# Patient Record
Sex: MALE | Race: WHITE | NOT HISPANIC OR LATINO | Employment: OTHER | URBAN - METROPOLITAN AREA
[De-identification: names, ages, dates, MRNs, and addresses within clinical notes are randomized per-mention and may not be internally consistent; named-entity substitution may affect disease eponyms.]

---

## 2017-05-11 ENCOUNTER — OFFICE VISIT (OUTPATIENT)
Dept: GASTROENTEROLOGY | Facility: CLINIC | Age: 67
End: 2017-05-11

## 2017-05-11 VITALS
TEMPERATURE: 97.8 F | BODY MASS INDEX: 32.95 KG/M2 | HEIGHT: 64 IN | WEIGHT: 193 LBS | SYSTOLIC BLOOD PRESSURE: 122 MMHG | HEART RATE: 74 BPM | DIASTOLIC BLOOD PRESSURE: 78 MMHG | OXYGEN SATURATION: 97 %

## 2017-05-11 DIAGNOSIS — K56.60 INTESTINAL OBSTRUCTION, UNSPECIFIED TYPE: Primary | ICD-10-CM

## 2017-05-11 DIAGNOSIS — K55.9: ICD-10-CM

## 2017-05-11 PROBLEM — K56.609 INTESTINAL OBSTRUCTION: Status: ACTIVE | Noted: 2017-05-11

## 2017-05-11 PROCEDURE — 99213 OFFICE O/P EST LOW 20 MIN: CPT | Performed by: INTERNAL MEDICINE

## 2017-05-11 RX ORDER — ATORVASTATIN CALCIUM 40 MG/1
TABLET, FILM COATED ORAL
Refills: 0 | COMMUNITY
Start: 2017-04-19 | End: 2021-09-29 | Stop reason: SDUPTHER

## 2017-05-11 RX ORDER — LEVOTHYROXINE SODIUM 75 MCG
TABLET ORAL
Refills: 0 | COMMUNITY
Start: 2017-04-19 | End: 2021-09-29 | Stop reason: SDUPTHER

## 2021-09-29 ENCOUNTER — OFFICE VISIT (OUTPATIENT)
Dept: RADIATION ONCOLOGY | Facility: HOSPITAL | Age: 71
End: 2021-09-29

## 2021-09-29 ENCOUNTER — HOSPITAL ENCOUNTER (OUTPATIENT)
Dept: RADIATION ONCOLOGY | Facility: HOSPITAL | Age: 71
Setting detail: RADIATION/ONCOLOGY SERIES
Discharge: HOME | End: 2021-09-29

## 2021-09-29 VITALS
TEMPERATURE: 97.7 F | HEART RATE: 77 BPM | RESPIRATION RATE: 20 BRPM | DIASTOLIC BLOOD PRESSURE: 80 MMHG | BODY MASS INDEX: 31.28 KG/M2 | OXYGEN SATURATION: 96 % | SYSTOLIC BLOOD PRESSURE: 130 MMHG | WEIGHT: 180.8 LBS

## 2021-09-29 DIAGNOSIS — C61 PROSTATE CANCER (HCC): Primary | ICD-10-CM

## 2021-09-29 RX ORDER — ATORVASTATIN CALCIUM 20 MG/1
TABLET, FILM COATED ORAL
COMMUNITY
End: 2022-04-13 | Stop reason: SDUPTHER

## 2021-09-29 RX ORDER — LEVOTHYROXINE SODIUM 88 MCG
88 TABLET ORAL EVERY MORNING
COMMUNITY
Start: 2021-09-11

## 2021-09-29 RX ORDER — HYDROXYZINE PAMOATE 25 MG/1
CAPSULE ORAL
COMMUNITY
End: 2022-04-13 | Stop reason: SDUPTHER

## 2021-09-29 RX ORDER — LEVOTHYROXINE SODIUM 0.05 MG/1
TABLET ORAL
COMMUNITY
End: 2021-09-29 | Stop reason: SDUPTHER

## 2021-09-29 NOTE — PROGRESS NOTES
CONSULTATION NOTE      :                                                          1950  DATE OF CONSULTATION:                       2021   REQUESTING PHYSICIAN:                   Boom Durham MD  REASON FOR CONSULTATION:           Prostate cancer (HCC)  - Stage I (cT1c, cN0, cM0, PSA: 9.3, Grade Group: 1)       BRIEF HISTORY:  The patient is a very pleasant 71 y.o. male  with prostate cancer on active surveillance.  He was diagnosed with very low risk disease on 2020.  PSA was 7.58 ng/ml.  Biopsy showed presence of prostatic adenocarcinoma, Morro score 3+3 = 6, involving 2 out of 6 cores from the left lobe, at left base.  Tumor occupied 30% of submitted tissue.    Right lobe was benign but with focal atypical small acinar proliferation at the right mid gland.  Prostate gland measured 39 cc with no suspicious sonographic or palpable abnormality.  He experienced post biopsy prostatitis with E. coli infection.  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  Unfortunately, his PSA has steadily increased with values 7.9 ng/ml 2020 and maximum value 9.34 ng/mL on 8/3/2021.  He is now pursuing pursuing definitive treatment which is reasonable with [facility] healthy life expectancy calculator predicting an additional 22.9-year longevity.    Allergy: No Known Allergies    Social History:   Social History     Socioeconomic History   • Marital status:      Spouse name: Not on file   • Number of children: Not on file   • Years of education: Not on file   • Highest education level: Not on file   Tobacco Use   • Smoking status: Never Smoker   • Smokeless tobacco: Never Used   Substance and Sexual Activity   • Alcohol use: Yes   • Drug use: No   • Sexual activity: Defer       Past Medical History:   Past Medical History:   Diagnosis Date   • Disease of thyroid gland    • Diverticulosis    • Prostate cancer  (CMS/HCC)    • Skin cancer     basal cell and squamous cell       Family History: family history includes Lung cancer in his brother and brother.     Surgical History:   Past Surgical History:   Procedure Laterality Date   • APPENDECTOMY     • COLON RESECTION RIGHT     • COLON SURGERY      reanastomosis from colon resection   • COLONOSCOPY  04/09/2014   • PROSTATE BIOPSY          Review of Systems:   Review of Systems - Oncology              IPSS Questionnaire (AUA-7):  Over the past month…    1)  Incomplete Emptying  How often have you had a sensation of not emptying your bladder?  0 - Not at all   2)  Frequency  How often have you had to urinate less than every two hours? 0 - Not at all   3)  Intermittency  How often have you found you stopped and started again several times when you urinated?  0 - Not at all   4) Urgency  How often have you found it difficult to postpone urination?  0 - Not at all   5) Weak Stream  How often have you had a weak urinary stream?  0 - Not at all   6) Straining  How often have you had to push or strain to begin urination?  0 - Not at all   7) Nocturia  How many times did you typically get up at night to urinate?  2 - 2 times   Total Score:  2       Quality of life due to urinary symptoms:  If you were to spend the rest of your life with your urinary condition the way it is now, how would you feel about that? 0-Delighted   Urine Leakage (Incontinence) 0-No Leakage     Sexual Health Inventory  Current Status    1)  How do you rate your confidence that you could achieve and keep an erection? 4-High   2) When you had erections with sexual stimulation, how often were your erections hard enough for penetration (entering your partner)? 5-Almost always or always   3)  During sexual intercourse, how often were you able to maintain your erection after you had penetrated (entered) into your partner? 5-Almost always or always   4) During sexual intercourse, how difficult was it to maintain your  erection to completion of intercourse? 5 -almost always or always   5) When you attempted sexual intercourse, how often was it satisfactory to you? 5-Almost always or always   Total Score: 24       Bowel Health Inventory  Current Status: 0-No problems, no rectal bleeding, no discharge, less then 5 bowel movements a day           Objective   VITAL SIGNS:   Vitals:    09/29/21 1009   BP: 130/80   Pulse: 77   Resp: 20   Temp: 97.7 °F (36.5 °C)   TempSrc: Temporal   SpO2: 96%   Weight: 82 kg (180 lb 12.8 oz)   PainSc: 0-No pain        Karnofsky score: 90       Physical Exam:   Physical Exam  Vitals and nursing note reviewed.   Constitutional:       Appearance: He is well-developed.   HENT:      Head: Normocephalic and atraumatic.   Cardiovascular:      Rate and Rhythm: Normal rate and regular rhythm.      Heart sounds: Normal heart sounds. No murmur heard.     Pulmonary:      Effort: Pulmonary effort is normal.      Breath sounds: Normal breath sounds. No wheezing or rales.   Abdominal:      General: Bowel sounds are normal. There is no distension.      Palpations: Abdomen is soft.      Tenderness: There is no abdominal tenderness.   Genitourinary:     Prostate: Not tender and no nodules present. Enlarged:  30 to 40 cc, symmetric, soft, no nodules or induration.  Seminal vesicles are nonpalpable.      Rectum: No mass, tenderness, external hemorrhoid or internal hemorrhoid. Normal anal tone.   Musculoskeletal:         General: No tenderness. Normal range of motion.      Cervical back: Normal range of motion and neck supple.   Lymphadenopathy:      Cervical: No cervical adenopathy.      Upper Body:      Right upper body: No supraclavicular adenopathy.      Left upper body: No supraclavicular adenopathy.   Skin:     General: Skin is warm and dry.   Neurological:      Mental Status: He is alert and oriented to person, place, and time.      Sensory: No sensory deficit.   Psychiatric:         Behavior: Behavior normal.          Thought Content: Thought content normal.         Judgment: Judgment normal.              The following portions of the patient's history were reviewed and updated as appropriate: allergies, current medications, past family history, past medical history, past social history, past surgical history and problem list.    Assessment:   Assessment      Prostate cancer, [place] 3+3 = 6, clinical stage I (T1c, N0, M0), diagnosed 6/9/2020 when his PSA was 7.58 ng/ml.  He had very low risk disease and initially chose active surveillance.  PSA has increased to recent maximum value 9.34 ng/mL.  Definitive treatment would be appropriate at this time.  We reviewed the radiation treatment options of IMRT, SBRT or brachytherapy.  Patient is most interested in stereotactic body radiotherapy.  The CyberKnife treatment procedures been reviewed in detail today.  All questions were answered.  Informed consent was obtained.    RECOMMENDATIONS: He will return to Dr. Durham for placement of gold seed fiducials.  He will subsequently return here for treatment planning CT and MRI pelvis.  The prostate gland will receive 5 daily fractions of 7 Gray each, on the CyberKnife.    Follow Up:   Return in about 2 weeks (around 10/13/2021) for Office Visit, Simulation.  Diagnoses and all orders for this visit:    1. Prostate cancer (HCC) (Primary)         Clement Nicole MD

## 2021-09-30 ENCOUNTER — TELEPHONE (OUTPATIENT)
Dept: RADIATION ONCOLOGY | Facility: HOSPITAL | Age: 71
End: 2021-09-30

## 2021-09-30 DIAGNOSIS — C61 PROSTATE CANCER (HCC): Primary | ICD-10-CM

## 2021-09-30 NOTE — TELEPHONE ENCOUNTER
Called pt to ask where he would prefer his colonoscopy done.  He states he wants it done in [place] with Dr. Ubaldo Bojorquez.  Explained I would get him scheduled.  Pt verbalized understanding.

## 2021-10-06 DIAGNOSIS — C61 PROSTATE CANCER (HCC): Primary | ICD-10-CM

## 2021-10-20 DIAGNOSIS — C61 PROSTATE CANCER (HCC): Primary | ICD-10-CM

## 2021-10-28 ENCOUNTER — DOCUMENTATION (OUTPATIENT)
Dept: NUTRITION | Facility: HOSPITAL | Age: 71
End: 2021-10-28

## 2021-10-28 NOTE — PROGRESS NOTES
[facility] Nutrition    Phone consult with patient regarding the Gas Elimination Diet and instructions in preparation for the imaging scans and CK treatment for prostate cancer.  Reviewed the rationale for the diet modification, gas forming foods, schedule for following, Gas X, enemas, NPO status x 6 hours prior to MRI and appointment times.  Patient verbalized excellent comprehension of diet; all questions were addressed.

## 2021-11-05 ENCOUNTER — HOSPITAL ENCOUNTER (OUTPATIENT)
Dept: RADIATION ONCOLOGY | Facility: HOSPITAL | Age: 71
Setting detail: RADIATION/ONCOLOGY SERIES
Discharge: HOME | End: 2021-11-05

## 2021-11-05 ENCOUNTER — OFFICE VISIT (OUTPATIENT)
Dept: RADIATION ONCOLOGY | Facility: HOSPITAL | Age: 71
End: 2021-11-05

## 2021-11-05 ENCOUNTER — HOSPITAL ENCOUNTER (OUTPATIENT)
Dept: RADIATION ONCOLOGY | Facility: HOSPITAL | Age: 71
Discharge: HOME | End: 2021-11-05

## 2021-11-05 ENCOUNTER — HOSPITAL ENCOUNTER (OUTPATIENT)
Dept: MRI IMAGING | Facility: HOSPITAL | Age: 71
Discharge: HOME | End: 2021-11-05
Admitting: RADIOLOGY

## 2021-11-05 VITALS
HEART RATE: 77 BPM | BODY MASS INDEX: 31.33 KG/M2 | SYSTOLIC BLOOD PRESSURE: 140 MMHG | WEIGHT: 181.1 LBS | DIASTOLIC BLOOD PRESSURE: 71 MMHG | OXYGEN SATURATION: 97 % | TEMPERATURE: 96.8 F | RESPIRATION RATE: 20 BRPM

## 2021-11-05 DIAGNOSIS — C61 PROSTATE CANCER (HCC): Primary | ICD-10-CM

## 2021-11-05 DIAGNOSIS — C61 PROSTATE CANCER (HCC): ICD-10-CM

## 2021-11-05 LAB

## 2021-11-05 PROCEDURE — 87186 SC STD MICRODIL/AGAR DIL: CPT | Performed by: RADIOLOGY

## 2021-11-05 PROCEDURE — G0463 HOSPITAL OUTPT CLINIC VISIT: HCPCS

## 2021-11-05 PROCEDURE — 77399 UNLISTED PX MED RADJ PHYSICS: CPT | Performed by: RADIOLOGY

## 2021-11-05 PROCEDURE — 87077 CULTURE AEROBIC IDENTIFY: CPT | Performed by: RADIOLOGY

## 2021-11-05 PROCEDURE — 81001 URINALYSIS AUTO W/SCOPE: CPT | Performed by: RADIOLOGY

## 2021-11-05 PROCEDURE — 72195 MRI PELVIS W/O DYE: CPT

## 2021-11-05 PROCEDURE — 87086 URINE CULTURE/COLONY COUNT: CPT | Performed by: RADIOLOGY

## 2021-11-05 RX ORDER — LEVOFLOXACIN 500 MG/1
TABLET, FILM COATED ORAL EVERY 24 HOURS
COMMUNITY
End: 2022-02-16

## 2021-11-05 RX ORDER — TAMSULOSIN HYDROCHLORIDE 0.4 MG/1
1 CAPSULE ORAL NIGHTLY
Qty: 30 CAPSULE | Refills: 11 | Status: SHIPPED | OUTPATIENT
Start: 2021-11-05

## 2021-11-05 NOTE — PROGRESS NOTES
RE-EVALUATION    PATIENT:                                                      Talon Anguiano  :                                                          1950  DATE:                          2021   DIAGNOSIS:     Prostate cancer (HCC)  - Stage I (cT1c, cN0, cM0, PSA: 9.3, Grade Group: 1)       BRIEF HISTORY:  The patient is a very pleasant 71 y.o. male  with low risk prostate cancer.  He chose undergo definitive treatment with stereotactic body radiotherapy.  He experienced moderate prostatitis symptoms following placement of gold seed fiducials which lasted at least 3 days.  He did not take the full course of Levaquin due to reported symptoms.  Azo-Standard did not help dysuria or frequency symptoms.  He took 1 sulfa pill.  Maximum reported temperature was 100.7 degrees.  Urinary voiding has subsequently improved but symptoms have not completely resolved.  He is here today for simulation.  He has partially complied with the diet but has not been taking simethicone regularly.  He did perform an enema.      No Known Allergies    Review of Systems   All other systems reviewed and are negative.            IPSS Questionnaire (AUA-7):  Over the past month…     1)  Incomplete Emptying  How often have you had a sensation of not emptying your bladder?  0 - Not at all   2)  Frequency  How often have you had to urinate less than every two hours? 0 - Not at all   3)  Intermittency  How often have you found you stopped and started again several times when you urinated?  0 - Not at all   4) Urgency  How often have you found it difficult to postpone urination?  0 - Not at all   5) Weak Stream  How often have you had a weak urinary stream?  0 - Not at all   6) Straining  How often have you had to push or strain to begin urination?  0 - Not at all   7) Nocturia  How many times did you typically get up at night to urinate?  2 - 2 times   Total Score:  2         Quality of life due to urinary symptoms:  If you were to  spend the rest of your life with your urinary condition the way it is now, how would you feel about that? 0-Delighted   Urine Leakage (Incontinence) 0-No Leakage      Sexual Health Inventory  Current Status     1)  How do you rate your confidence that you could achieve and keep an erection? 4-High   2) When you had erections with sexual stimulation, how often were your erections hard enough for penetration (entering your partner)? 5-Almost always or always   3)  During sexual intercourse, how often were you able to maintain your erection after you had penetrated (entered) into your partner? 5-Almost always or always   4) During sexual intercourse, how difficult was it to maintain your erection to completion of intercourse? 5 -almost always or always   5) When you attempted sexual intercourse, how often was it satisfactory to you? 5-Almost always or always   Total Score: 24         Bowel Health Inventory  Current Status: 0-No problems, no rectal bleeding, no discharge, less then 5 bowel movements a day                    Objective   VITAL SIGNS:   Vitals:    11/05/21 0943   BP: 140/71   Pulse: 77   Resp: 20   Temp: 96.8 °F (36 °C)   TempSrc: Temporal   SpO2: 97%   Weight: 82.1 kg (181 lb 1.6 oz)   PainSc: 0-No pain            KSP %:  90    Physical Exam         The following portions of the patient's history were reviewed and updated as appropriate: allergies, current medications, past family history, past medical history, past social history, past surgical history and problem list.    Diagnoses and all orders for this visit:    Prostate cancer (HCC)  -     Urinalysis With Culture If Indicated -; Future  -     Urinalysis With Culture If Indicated -    Other orders  -     tamsulosin (FLOMAX) 0.4 MG capsule 24 hr capsule; Take 1 capsule by mouth Every Night.    IMPRESSION:  Prostate cancer, [place] 3+3 = 6, clinical stage I (T1c, N0, M0), diagnosed 6/9/2020 when his PSA was 7.58 ng/ml.  Following placement of gold seed  fiducials he experienced prostatitis type symptoms which improved but have not completely resolved.  Of note, he experienced prior prostatitis and urosepsis after biopsy.  Since symptoms have considerably improved over the past week, I believe we can proceed with treatment planning CT and MRI, as planned.  We will obtain a urinalysis today.    RECOMMENDATIONS:  As long as urinalysis, check CT and MRI do not show infection or significant prostatitis, we will proceed with CyberKnife treatment planning to deliver 35 Gray to the prostate gland and 5 fractions over approximately 1 week.       Clement Nicole MD

## 2021-11-06 RX ORDER — SULFAMETHOXAZOLE AND TRIMETHOPRIM 800; 160 MG/1; MG/1
1 TABLET ORAL 2 TIMES DAILY
Qty: 20 TABLET | Refills: 0 | Status: SHIPPED | OUTPATIENT
Start: 2021-11-06 | End: 2022-04-13

## 2021-11-07 ENCOUNTER — DOCUMENTATION (OUTPATIENT)
Dept: RADIATION ONCOLOGY | Facility: HOSPITAL | Age: 71
End: 2021-11-07

## 2021-11-07 LAB — BACTERIA SPEC AEROBE CULT: ABNORMAL

## 2021-11-07 RX ORDER — NITROFURANTOIN 25; 75 MG/1; MG/1
100 CAPSULE ORAL 2 TIMES DAILY
Qty: 20 CAPSULE | Refills: 0 | Status: SHIPPED | OUTPATIENT
Start: 2021-11-07 | End: 2021-12-01 | Stop reason: SDUPTHER

## 2021-11-07 NOTE — PROGRESS NOTES
Urine culture noted yesterday to be positive for greater than 100,000 CFU per mL E. coli.  I sent prescription for 10-day course of Bactrim yesterday.  Telephone conversation with patient today, he reports significant improvement with clearing of urine and reduction of urinary irritative symptoms.  Antibiotic sensitivity test available today; however, reports resistance to trimethoprim sulfamethoxazole as well as resistance to Levaquin and penicillins.  There is sensitivity to nitrofurantoin and to cephalosporins.  Patient reports he took 1 dose of Keflex on his own which may have partially helped his symptoms.  I will send in a new prescription for 10-day course of Macrobid (nitrofurantoin), which patient will begin today.

## 2021-11-08 ENCOUNTER — TELEPHONE (OUTPATIENT)
Dept: RADIATION ONCOLOGY | Facility: HOSPITAL | Age: 71
End: 2021-11-08

## 2021-11-08 NOTE — TELEPHONE ENCOUNTER
Called to inform pt that sensitivity of the urine culture and Dr. Moraes recommends that in addition to the macrobid that he get a one time injection of Ceftriaxone 1gram IM with his PCP or at an urgent care.  Pt verbalized understanding.

## 2021-11-12 ENCOUNTER — DOCUMENTATION (OUTPATIENT)
Dept: RADIATION ONCOLOGY | Facility: HOSPITAL | Age: 71
End: 2021-11-12

## 2021-11-12 NOTE — PROGRESS NOTES
Treatment planning contouring of the prostate gland based on CT simulation and MRI reveal a greater than 50 cc prostate volume with inflammatory changes.  The prostate was previously estimated at 30 to 40 cc volume.  Patient was found on urine culture from the day of the simulation to have a significant urinary tract infection after gold seed fiducial placement.  Antibiotic therapy has been instituted with Macrobid and Rocephin x 3 doses and he is clinically improving.  He does describe back pain which bothers him at night while resting in bed, and he will seek evaluation by Dr Martin to evaluate for muscle strain vs pyelonephritis.    Plan:  Patient will require a repeat CT simulation and repeat MRI in order to accurately treat the prostate cancer after resolution of the prostatitis/UTI which was causing inflammation and enlargement of the prostate gland.

## 2021-11-15 ENCOUNTER — TELEPHONE (OUTPATIENT)
Dept: RADIATION ONCOLOGY | Facility: HOSPITAL | Age: 71
End: 2021-11-15

## 2021-11-15 DIAGNOSIS — C61 PROSTATE CANCER (HCC): Primary | ICD-10-CM

## 2021-11-15 NOTE — TELEPHONE ENCOUNTER
Notified pt that Dr. Nicole wants to repeat his CT and MRI because he wants the infection of prostate cleared.  Discussed in detail with pt regarding diet and prep for the procedure.  On 11/27/2021 pt to begin gas reducing diet with gas-x 4 times per day.  On 11/29/2021 @ 0800 clinic  @ 0900 CT simulation  @ 1130 MRI.  Letter also mailed to pt.  Pt verbalized understanding.

## 2021-11-29 ENCOUNTER — HOSPITAL ENCOUNTER (OUTPATIENT)
Dept: RADIATION ONCOLOGY | Facility: HOSPITAL | Age: 71
Discharge: HOME | End: 2021-11-29

## 2021-11-29 ENCOUNTER — HOSPITAL ENCOUNTER (OUTPATIENT)
Dept: MRI IMAGING | Facility: HOSPITAL | Age: 71
Discharge: HOME | End: 2021-11-29
Admitting: RADIOLOGY

## 2021-11-29 ENCOUNTER — OFFICE VISIT (OUTPATIENT)
Dept: RADIATION ONCOLOGY | Facility: HOSPITAL | Age: 71
End: 2021-11-29

## 2021-11-29 VITALS
OXYGEN SATURATION: 97 % | DIASTOLIC BLOOD PRESSURE: 76 MMHG | TEMPERATURE: 97.6 F | WEIGHT: 183 LBS | HEIGHT: 72 IN | SYSTOLIC BLOOD PRESSURE: 141 MMHG | BODY MASS INDEX: 24.79 KG/M2 | RESPIRATION RATE: 16 BRPM | HEART RATE: 71 BPM

## 2021-11-29 DIAGNOSIS — C61 PROSTATE CANCER (HCC): Primary | ICD-10-CM

## 2021-11-29 DIAGNOSIS — C61 PROSTATE CANCER (HCC): ICD-10-CM

## 2021-11-29 LAB
BACTERIA UR QL AUTO: ABNORMAL /HPF
BILIRUB UR QL STRIP: NEGATIVE
CLARITY UR: CLEAR
COLOR UR: YELLOW
GLUCOSE UR STRIP-MCNC: NEGATIVE MG/DL
HGB UR QL STRIP.AUTO: NEGATIVE
HYALINE CASTS UR QL AUTO: ABNORMAL /LPF
KETONES UR QL STRIP: NEGATIVE
LEUKOCYTE ESTERASE UR QL STRIP.AUTO: ABNORMAL
NITRITE UR QL STRIP: POSITIVE
PH UR STRIP.AUTO: 6 [PH] (ref 5–8)
PROT UR QL STRIP: NEGATIVE
RBC # UR STRIP: ABNORMAL /HPF
REF LAB TEST METHOD: ABNORMAL
SP GR UR STRIP: 1.02 (ref 1–1.03)
SQUAMOUS #/AREA URNS HPF: ABNORMAL /HPF
UROBILINOGEN UR QL STRIP: ABNORMAL
WBC # UR STRIP: ABNORMAL /HPF

## 2021-11-29 PROCEDURE — 87086 URINE CULTURE/COLONY COUNT: CPT | Performed by: NURSE PRACTITIONER

## 2021-11-29 PROCEDURE — 87088 URINE BACTERIA CULTURE: CPT | Performed by: NURSE PRACTITIONER

## 2021-11-29 PROCEDURE — 81001 URINALYSIS AUTO W/SCOPE: CPT | Performed by: NURSE PRACTITIONER

## 2021-11-29 PROCEDURE — 77399 UNLISTED PX MED RADJ PHYSICS: CPT | Performed by: NURSE PRACTITIONER

## 2021-11-29 PROCEDURE — 87186 SC STD MICRODIL/AGAR DIL: CPT | Performed by: NURSE PRACTITIONER

## 2021-11-29 PROCEDURE — 72195 MRI PELVIS W/O DYE: CPT

## 2021-11-29 NOTE — PROGRESS NOTES
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  He chose to undergo definitive treatment with CyberKnife stereotactic body radiotherapy.  Following placement of gold seed fiducials, he developed prostatitis type symptoms.  UA and urine culture obtained previously in our office on the day of simulation revealed significant UTI.  He was also found to have inflammation and enlargement of the prostate on CT simulation and MRI pelvis at that time.  He has most recently completed courses of Macrobid x 10 days and Rocephin IM x 3 doses.  Urinary voiding has subsequently improved and previously reported irritative/outflow obstructive symptoms are resolved.  He has started alpha-blocker with Flomax.  He denies hematuria, dysuria, flank pain, or constitutional symptoms.  He does report some ongoing foul smelling urine at times.  He has had no other change in health since informed consent was obtained on 2021.  He remains a good candidate for SBRT.  He is on all recommended dietary restrictions and prep.    He is here today for simulation.          No Known Allergies    Review of Systems   All other systems reviewed and are negative.         KPS 90%    IPSS Questionnaire (AUA-7):  Over the past month…     1)  Incomplete Emptying  How often have you had a sensation of not emptying your bladder?  0 - Not at all   2)  Frequency  How often have you had to urinate less than every two hours? 0 - Not at all   3)  Intermittency  How often have you found you stopped and started again several times when you urinated?  0 - Not at all   4) Urgency  How often have you found it  "difficult to postpone urination?  0 - Not at all   5) Weak Stream  How often have you had a weak urinary stream?  0 - Not at all   6) Straining  How often have you had to push or strain to begin urination?  0 - Not at all   7) Nocturia  How many times did you typically get up at night to urinate?  2 - 2 times   Total Score:  2         Quality of life due to urinary symptoms:  If you were to spend the rest of your life with your urinary condition the way it is now, how would you feel about that? 0-Delighted   Urine Leakage (Incontinence) 0-No Leakage      Sexual Health Inventory  Current Status     1)  How do you rate your confidence that you could achieve and keep an erection? 4-High   2) When you had erections with sexual stimulation, how often were your erections hard enough for penetration (entering your partner)? 5-Almost always or always   3)  During sexual intercourse, how often were you able to maintain your erection after you had penetrated (entered) into your partner? 5-Almost always or always   4) During sexual intercourse, how difficult was it to maintain your erection to completion of intercourse? 5 -almost always or always   5) When you attempted sexual intercourse, how often was it satisfactory to you? 5-Almost always or always   Total Score: 24         Bowel Health Inventory  Current Status: 0-No problems, no rectal bleeding, no discharge, less then 5 bowel movements a day        Objective   VITAL SIGNS:   Vitals:    11/29/21 0805   BP: 141/76   Pulse: 71   Resp: 16   Temp: 97.6 °F (36.4 °C)   SpO2: 97%  Comment: RA   Weight: 83 kg (183 lb)   Height: 182.9 cm (72\")   PainSc: 0-No pain                Physical Exam  Vitals and nursing note reviewed.   Constitutional:       General: He is not in acute distress.     Appearance: Normal appearance. He is well-developed.   HENT:      Head: Normocephalic and atraumatic.   Eyes:      Conjunctiva/sclera: Conjunctivae normal.      Pupils: Pupils are equal, " round, and reactive to light.   Cardiovascular:      Rate and Rhythm: Normal rate and regular rhythm.   Pulmonary:      Effort: Pulmonary effort is normal.   Abdominal:      General: There is no distension.      Palpations: Abdomen is soft.      Tenderness: There is no abdominal tenderness. There is no right CVA tenderness or left CVA tenderness.   Musculoskeletal:         General: Normal range of motion.      Cervical back: Normal range of motion and neck supple.   Skin:     General: Skin is warm and dry.   Neurological:      Mental Status: He is alert and oriented to person, place, and time.   Psychiatric:         Behavior: Behavior normal.         Thought Content: Thought content normal.         Judgment: Judgment normal.            The following portions of the patient's history were reviewed and updated as appropriate: allergies, current medications, past family history, past medical history, past social history, past surgical history and problem list.    Diagnoses and all orders for this visit:    Prostate cancer (HCC)  -     Urinalysis With Culture If Indicated - Urine, Clean Catch; Future      IMPRESSION:  Prostate cancer, [place] 3+3 = 6, clinical stage I (T1c, N0, M0), diagnosed 6/9/2020 when his PSA was 7.58 ng/ml.  Maximum PSA value 9.34 ng/ml on 8/3/2021.  We will obtain repeat UA today with culture as indicated.      RECOMMENDATIONS:  Treatment planning CT and MRI pelvis will be performed today.  As long as urinalysis, treatment planning CT and MRI do not show residual infection or significant prostatitis, we will proceed with CyberKnife treatment planning to deliver 35 Gray to the prostate gland in 5 fractions over approximately 1 week, delivered on the CyberKnife.  He has been following all recommended dietary restrictions and prep.       DELORES Salazar

## 2021-11-30 DIAGNOSIS — C61 PROSTATE CANCER (HCC): Primary | ICD-10-CM

## 2021-12-01 ENCOUNTER — DOCUMENTATION (OUTPATIENT)
Dept: RADIATION ONCOLOGY | Facility: HOSPITAL | Age: 71
End: 2021-12-01

## 2021-12-01 ENCOUNTER — HOSPITAL ENCOUNTER (OUTPATIENT)
Dept: RADIATION ONCOLOGY | Facility: HOSPITAL | Age: 71
Setting detail: RADIATION/ONCOLOGY SERIES
Discharge: HOME | End: 2021-12-01

## 2021-12-01 LAB — BACTERIA SPEC AEROBE CULT: ABNORMAL

## 2021-12-01 RX ORDER — NITROFURANTOIN 25; 75 MG/1; MG/1
100 CAPSULE ORAL 2 TIMES DAILY
Qty: 20 CAPSULE | Refills: 0 | Status: SHIPPED | OUTPATIENT
Start: 2021-12-01 | End: 2022-02-16

## 2021-12-01 NOTE — PROGRESS NOTES
Telephone conversation with patient.    He completed prescribed course of antibiotics for E. coli UTI/prostatitis following placement of gold seed fiducials.  He had 10-day course of Macrobid and Rocephin x3.  Urinary irritative voiding symptoms are significantly improved but have not completely resolved.  Repeat treatment planning CT and MRI show improvement with less edema in the prostate gland.  The prostate volume has reduced from approximately 52 cc down to approximately 38 cc which is closer to the size noted on prior exam and imaging studies.  Recent repeat urinalysis and culture show some improvement with less bacteria and resolution of RBCs; however, there is still greater than 100,000 CFU E. coli with similar susceptibility/resistance profile.    Impression: Urinary tract infection/prostatitis is improved but not resolved after appropriate antibiotics course.    Stereotactic body radiotherapy to treat his prostate cancer should not be initiated until infection completely clears.    Patient we referred to infectious disease service for management.    He may require repeat treatment planning imaging studies.    I refilled Macrobid to restart until he is able to see infectious disease specialist.

## 2021-12-13 ENCOUNTER — TELEPHONE (OUTPATIENT)
Dept: RADIATION ONCOLOGY | Facility: HOSPITAL | Age: 71
End: 2021-12-13

## 2021-12-13 NOTE — TELEPHONE ENCOUNTER
Pt called asking about referral to infectious disease.   I explained we have sent everything to their office and I have left a message with the referral coordinator.   I explained as soon as they call me I will contact him.  Pt verbalized understanding.

## 2021-12-13 NOTE — TELEPHONE ENCOUNTER
Pt notified of appointment with infectious dz on Wed Dec 15 @ 1200.  Pt verbalized understanding.

## 2021-12-17 ENCOUNTER — TELEPHONE (OUTPATIENT)
Dept: RADIATION ONCOLOGY | Facility: HOSPITAL | Age: 71
End: 2021-12-17

## 2021-12-17 NOTE — TELEPHONE ENCOUNTER
Called 's office to get notes faxed over.  Office staff states pt did not show up for appointment.   Called pt and he states he was too tired to go to appointment.  He states Dr. Durham told him he needed 10 days of IV antibiotics.  I explained he needs to see ID because they are the experts.  I gave the pt Dr. Purvis's office number and instructed him to reschedule the appointment and call me back once he has been seen.  Pt verbalized understanding.

## 2022-01-10 ENCOUNTER — TELEPHONE (OUTPATIENT)
Dept: RADIATION ONCOLOGY | Facility: HOSPITAL | Age: 72
End: 2022-01-10

## 2022-01-10 NOTE — TELEPHONE ENCOUNTER
Pt called with c/o another urinary tract infection.  I explained he had been given an appointment to see infectious disease doctor and he did not go nor did he reschedule his appointment.  Pt argued that it took too long to get in to be seen.  I explained I have no control over their schedule and if he'd been seen on December 15, 2021, his original appointment, all this could have been cleared up by now. I asked pt what he wants me to do for him since he did not go to the ID appointment.  He states he wants to talk to Dr. Nicole and wants to be treated with IV antibiotics at home.  I explained Dr. Nicole does not order IV antibiotics and that he needs to see an ID specialist.  I told pt I would have Dr. Nicole call him.  Pt verbalized understanding.

## 2022-01-11 ENCOUNTER — TELEPHONE (OUTPATIENT)
Dept: RADIATION ONCOLOGY | Facility: HOSPITAL | Age: 72
End: 2022-01-11

## 2022-01-11 NOTE — TELEPHONE ENCOUNTER
Called to inform pt his appointment with Dr. Purvis is today at 2:15pm.  He asked what time it is now.  I explained its 2:08pm.  He states I really need to see him today.  I gave pt the office number and instructed him to call.  Pt verbalized understanding.

## 2022-01-11 NOTE — TELEPHONE ENCOUNTER
Attempted to contact pt.  No answer.  Left message that Dr. Nicole has personally spoke with Dr. Purvis and his office will be contacting him to get him an appointment this week.

## 2022-01-14 DIAGNOSIS — C61 PROSTATE CANCER: Primary | ICD-10-CM

## 2022-01-20 ENCOUNTER — TRANSCRIBE ORDERS (OUTPATIENT)
Dept: LAB | Facility: HOSPITAL | Age: 72
End: 2022-01-20

## 2022-01-20 ENCOUNTER — LAB (OUTPATIENT)
Dept: LAB | Facility: HOSPITAL | Age: 72
End: 2022-01-20

## 2022-01-20 DIAGNOSIS — C61 MALIGNANT NEOPLASM OF PROSTATE: ICD-10-CM

## 2022-01-20 DIAGNOSIS — N41.9 PROSTATITIS, UNSPECIFIED PROSTATITIS TYPE: ICD-10-CM

## 2022-01-20 DIAGNOSIS — B96.29 NON-SHIGA TOXIN-PRODUCING E. COLI: ICD-10-CM

## 2022-01-20 DIAGNOSIS — N39.0 URINARY TRACT INFECTION WITHOUT HEMATURIA, SITE UNSPECIFIED: Primary | ICD-10-CM

## 2022-01-20 DIAGNOSIS — N39.0 URINARY TRACT INFECTION WITHOUT HEMATURIA, SITE UNSPECIFIED: ICD-10-CM

## 2022-01-20 LAB
ALBUMIN SERPL-MCNC: 4.5 G/DL (ref 3.5–5.2)
ALBUMIN/GLOB SERPL: 2 G/DL
ALP SERPL-CCNC: 82 U/L (ref 39–117)
ALT SERPL W P-5'-P-CCNC: 19 U/L (ref 1–41)
ANION GAP SERPL CALCULATED.3IONS-SCNC: 11 MMOL/L (ref 5–15)
AST SERPL-CCNC: 18 U/L (ref 1–40)
BASOPHILS # BLD AUTO: 0.06 10*3/MM3 (ref 0–0.2)
BASOPHILS NFR BLD AUTO: 0.7 % (ref 0–1.5)
BILIRUB SERPL-MCNC: 0.4 MG/DL (ref 0–1.2)
BUN SERPL-MCNC: 18 MG/DL (ref 8–23)
BUN/CREAT SERPL: 19.8 (ref 7–25)
CALCIUM SPEC-SCNC: 8.9 MG/DL (ref 8.6–10.5)
CHLORIDE SERPL-SCNC: 100 MMOL/L (ref 98–107)
CO2 SERPL-SCNC: 28 MMOL/L (ref 22–29)
CREAT SERPL-MCNC: 0.91 MG/DL (ref 0.76–1.27)
CRP SERPL-MCNC: <0.3 MG/DL (ref 0–0.5)
DEPRECATED RDW RBC AUTO: 44.6 FL (ref 37–54)
EOSINOPHIL # BLD AUTO: 0.19 10*3/MM3 (ref 0–0.4)
EOSINOPHIL NFR BLD AUTO: 2.4 % (ref 0.3–6.2)
ERYTHROCYTE [DISTWIDTH] IN BLOOD BY AUTOMATED COUNT: 13.9 % (ref 12.3–15.4)
ERYTHROCYTE [SEDIMENTATION RATE] IN BLOOD: 7 MM/HR (ref 0–20)
GFR SERPL CREATININE-BSD FRML MDRD: 82 ML/MIN/1.73
GLOBULIN UR ELPH-MCNC: 2.3 GM/DL
GLUCOSE SERPL-MCNC: 121 MG/DL (ref 65–99)
HCT VFR BLD AUTO: 43.8 % (ref 37.5–51)
HGB BLD-MCNC: 14.1 G/DL (ref 13–17.7)
IMM GRANULOCYTES # BLD AUTO: 0.04 10*3/MM3 (ref 0–0.05)
IMM GRANULOCYTES NFR BLD AUTO: 0.5 % (ref 0–0.5)
LYMPHOCYTES # BLD AUTO: 2.41 10*3/MM3 (ref 0.7–3.1)
LYMPHOCYTES NFR BLD AUTO: 30 % (ref 19.6–45.3)
MCH RBC QN AUTO: 28.4 PG (ref 26.6–33)
MCHC RBC AUTO-ENTMCNC: 32.2 G/DL (ref 31.5–35.7)
MCV RBC AUTO: 88.3 FL (ref 79–97)
MONOCYTES # BLD AUTO: 0.53 10*3/MM3 (ref 0.1–0.9)
MONOCYTES NFR BLD AUTO: 6.6 % (ref 5–12)
NEUTROPHILS NFR BLD AUTO: 4.8 10*3/MM3 (ref 1.7–7)
NEUTROPHILS NFR BLD AUTO: 59.8 % (ref 42.7–76)
NRBC BLD AUTO-RTO: 0 /100 WBC (ref 0–0.2)
PLATELET # BLD AUTO: 219 10*3/MM3 (ref 140–450)
PMV BLD AUTO: 10.6 FL (ref 6–12)
POTASSIUM SERPL-SCNC: 4.4 MMOL/L (ref 3.5–5.2)
PROT SERPL-MCNC: 6.8 G/DL (ref 6–8.5)
RBC # BLD AUTO: 4.96 10*6/MM3 (ref 4.14–5.8)
SODIUM SERPL-SCNC: 139 MMOL/L (ref 136–145)
WBC NRBC COR # BLD: 8.03 10*3/MM3 (ref 3.4–10.8)

## 2022-01-20 PROCEDURE — 86140 C-REACTIVE PROTEIN: CPT

## 2022-01-20 PROCEDURE — 36415 COLL VENOUS BLD VENIPUNCTURE: CPT

## 2022-01-20 PROCEDURE — 85025 COMPLETE CBC W/AUTO DIFF WBC: CPT

## 2022-01-20 PROCEDURE — 80053 COMPREHEN METABOLIC PANEL: CPT

## 2022-01-20 PROCEDURE — 85652 RBC SED RATE AUTOMATED: CPT

## 2022-01-27 ENCOUNTER — LAB (OUTPATIENT)
Dept: LAB | Facility: HOSPITAL | Age: 72
End: 2022-01-27

## 2022-01-27 ENCOUNTER — TRANSCRIBE ORDERS (OUTPATIENT)
Dept: LAB | Facility: HOSPITAL | Age: 72
End: 2022-01-27

## 2022-01-27 DIAGNOSIS — R31.9 URINARY TRACT INFECTION WITH HEMATURIA, SITE UNSPECIFIED: ICD-10-CM

## 2022-01-27 DIAGNOSIS — N39.0 URINARY TRACT INFECTION WITH HEMATURIA, SITE UNSPECIFIED: ICD-10-CM

## 2022-01-27 DIAGNOSIS — N39.0 URINARY TRACT INFECTION WITH HEMATURIA, SITE UNSPECIFIED: Primary | ICD-10-CM

## 2022-01-27 DIAGNOSIS — R31.9 URINARY TRACT INFECTION WITH HEMATURIA, SITE UNSPECIFIED: Primary | ICD-10-CM

## 2022-01-27 LAB
ALBUMIN SERPL-MCNC: 4.6 G/DL (ref 3.5–5.2)
ALBUMIN/GLOB SERPL: 2 G/DL
ALP SERPL-CCNC: 81 U/L (ref 39–117)
ALT SERPL W P-5'-P-CCNC: 22 U/L (ref 1–41)
ANION GAP SERPL CALCULATED.3IONS-SCNC: 10 MMOL/L (ref 5–15)
AST SERPL-CCNC: 18 U/L (ref 1–40)
BASOPHILS # BLD AUTO: 0.08 10*3/MM3 (ref 0–0.2)
BASOPHILS NFR BLD AUTO: 1 % (ref 0–1.5)
BILIRUB SERPL-MCNC: 0.4 MG/DL (ref 0–1.2)
BUN SERPL-MCNC: 17 MG/DL (ref 8–23)
BUN/CREAT SERPL: 17.2 (ref 7–25)
CALCIUM SPEC-SCNC: 9.1 MG/DL (ref 8.6–10.5)
CHLORIDE SERPL-SCNC: 100 MMOL/L (ref 98–107)
CO2 SERPL-SCNC: 27 MMOL/L (ref 22–29)
CREAT SERPL-MCNC: 0.99 MG/DL (ref 0.76–1.27)
CRP SERPL-MCNC: <0.3 MG/DL (ref 0–0.5)
DEPRECATED RDW RBC AUTO: 44.3 FL (ref 37–54)
EOSINOPHIL # BLD AUTO: 0.25 10*3/MM3 (ref 0–0.4)
EOSINOPHIL NFR BLD AUTO: 3.3 % (ref 0.3–6.2)
ERYTHROCYTE [DISTWIDTH] IN BLOOD BY AUTOMATED COUNT: 13.7 % (ref 12.3–15.4)
ERYTHROCYTE [SEDIMENTATION RATE] IN BLOOD: 8 MM/HR (ref 0–20)
GFR SERPL CREATININE-BSD FRML MDRD: 74 ML/MIN/1.73
GLOBULIN UR ELPH-MCNC: 2.3 GM/DL
GLUCOSE SERPL-MCNC: 119 MG/DL (ref 65–99)
HCT VFR BLD AUTO: 44.8 % (ref 37.5–51)
HGB BLD-MCNC: 14.6 G/DL (ref 13–17.7)
IMM GRANULOCYTES # BLD AUTO: 0.03 10*3/MM3 (ref 0–0.05)
IMM GRANULOCYTES NFR BLD AUTO: 0.4 % (ref 0–0.5)
LYMPHOCYTES # BLD AUTO: 2.8 10*3/MM3 (ref 0.7–3.1)
LYMPHOCYTES NFR BLD AUTO: 36.4 % (ref 19.6–45.3)
MCH RBC QN AUTO: 28.5 PG (ref 26.6–33)
MCHC RBC AUTO-ENTMCNC: 32.6 G/DL (ref 31.5–35.7)
MCV RBC AUTO: 87.5 FL (ref 79–97)
MONOCYTES # BLD AUTO: 0.49 10*3/MM3 (ref 0.1–0.9)
MONOCYTES NFR BLD AUTO: 6.4 % (ref 5–12)
NEUTROPHILS NFR BLD AUTO: 4.04 10*3/MM3 (ref 1.7–7)
NEUTROPHILS NFR BLD AUTO: 52.5 % (ref 42.7–76)
NRBC BLD AUTO-RTO: 0 /100 WBC (ref 0–0.2)
PLATELET # BLD AUTO: 211 10*3/MM3 (ref 140–450)
PMV BLD AUTO: 10.2 FL (ref 6–12)
POTASSIUM SERPL-SCNC: 4.5 MMOL/L (ref 3.5–5.2)
PROT SERPL-MCNC: 6.9 G/DL (ref 6–8.5)
RBC # BLD AUTO: 5.12 10*6/MM3 (ref 4.14–5.8)
SODIUM SERPL-SCNC: 137 MMOL/L (ref 136–145)
WBC NRBC COR # BLD: 7.69 10*3/MM3 (ref 3.4–10.8)

## 2022-01-27 PROCEDURE — 80053 COMPREHEN METABOLIC PANEL: CPT

## 2022-01-27 PROCEDURE — 85652 RBC SED RATE AUTOMATED: CPT

## 2022-01-27 PROCEDURE — 86140 C-REACTIVE PROTEIN: CPT

## 2022-01-27 PROCEDURE — 36415 COLL VENOUS BLD VENIPUNCTURE: CPT

## 2022-01-27 PROCEDURE — 85025 COMPLETE CBC W/AUTO DIFF WBC: CPT

## 2022-02-15 ENCOUNTER — HOSPITAL ENCOUNTER (OUTPATIENT)
Dept: RADIATION ONCOLOGY | Facility: HOSPITAL | Age: 72
Setting detail: RADIATION/ONCOLOGY SERIES
Discharge: HOME | End: 2022-02-15

## 2022-02-16 ENCOUNTER — OFFICE VISIT (OUTPATIENT)
Dept: RADIATION ONCOLOGY | Facility: HOSPITAL | Age: 72
End: 2022-02-16

## 2022-02-16 ENCOUNTER — HOSPITAL ENCOUNTER (OUTPATIENT)
Dept: RADIATION ONCOLOGY | Facility: HOSPITAL | Age: 72
Discharge: HOME | End: 2022-02-16

## 2022-02-16 ENCOUNTER — HOSPITAL ENCOUNTER (OUTPATIENT)
Dept: MRI IMAGING | Facility: HOSPITAL | Age: 72
Discharge: HOME | End: 2022-02-16
Admitting: RADIOLOGY

## 2022-02-16 VITALS
OXYGEN SATURATION: 97 % | HEART RATE: 70 BPM | RESPIRATION RATE: 16 BRPM | DIASTOLIC BLOOD PRESSURE: 67 MMHG | HEIGHT: 73 IN | SYSTOLIC BLOOD PRESSURE: 124 MMHG | TEMPERATURE: 97.7 F | WEIGHT: 184.5 LBS | BODY MASS INDEX: 24.45 KG/M2

## 2022-02-16 DIAGNOSIS — C61 PROSTATE CANCER: Primary | ICD-10-CM

## 2022-02-16 DIAGNOSIS — C61 PROSTATE CANCER: ICD-10-CM

## 2022-02-16 PROCEDURE — G0463 HOSPITAL OUTPT CLINIC VISIT: HCPCS

## 2022-02-16 PROCEDURE — 77290 THER RAD SIMULAJ FIELD CPLX: CPT | Performed by: RADIOLOGY

## 2022-02-16 PROCEDURE — 72195 MRI PELVIS W/O DYE: CPT

## 2022-02-16 PROCEDURE — 77399 UNLISTED PX MED RADJ PHYSICS: CPT | Performed by: RADIOLOGY

## 2022-02-16 RX ORDER — CEFUROXIME AXETIL 500 MG/1
500 TABLET ORAL 2 TIMES DAILY
COMMUNITY
Start: 2022-01-28 | End: 2022-04-13 | Stop reason: SDUPTHER

## 2022-02-16 NOTE — PROGRESS NOTES
RE-EVALUATION    PATIENT:                                                      Talon Anguiano  :                                                          1950  DATE:                          2022   DIAGNOSIS:     Prostate cancer (HCC)  - Stage I (cT1c, cN0, cM0, PSA: 9.3, Grade Group: 1)         BRIEF HISTORY:  The patient is a very pleasant 71 y.o. male  with diagnosis of low risk prostate cancer.  He chose to undergo definitive treatment with stereotactic body radiotherapy.  Treatment has unfortunately been delayed a couple months due to prostatitis which has been difficult to clear.  He eventually was seen by infectious disease, Dr. Alcides Hodgson, and received 2-week course of IV Ceftin.  He is now on 30-day maintenance with oral antibiotics.  Urinary tract symptoms have resolved and he is clinically doing very well.  He returns today for a third attempt at CT simulation in preparation for SBRT delivered on the CyberKnife.    No Known Allergies    Review of Systems   All other systems reviewed and are negative.               IPSS Questionnaire (AUA-7):  Over the past month…     1)  Incomplete Emptying  How often have you had a sensation of not emptying your bladder?  0 - Not at all   2)  Frequency  How often have you had to urinate less than every two hours? 0 - Not at all   3)  Intermittency  How often have you found you stopped and started again several times when you urinated?  0 - Not at all   4) Urgency  How often have you found it difficult to postpone urination?  0 - Not at all   5) Weak Stream  How often have you had a weak urinary stream?  0 - Not at all   6) Straining  How often have you had to push or strain to begin urination?  0 - Not at all   7) Nocturia  How many times did you typically get up at night to urinate?  2 - 2 times   Total Score:  2         Quality of life due to urinary symptoms:  If you were to spend the rest of your life with your urinary condition the way it is now,  "how would you feel about that? 0-Delighted   Urine Leakage (Incontinence) 0-No Leakage      Sexual Health Inventory  Current Status     1)  How do you rate your confidence that you could achieve and keep an erection? 4-High   2) When you had erections with sexual stimulation, how often were your erections hard enough for penetration (entering your partner)? 5-Almost always or always   3)  During sexual intercourse, how often were you able to maintain your erection after you had penetrated (entered) into your partner? 5-Almost always or always   4) During sexual intercourse, how difficult was it to maintain your erection to completion of intercourse? 5 -almost always or always   5) When you attempted sexual intercourse, how often was it satisfactory to you? 5-Almost always or always   Total Score: 24         Bowel Health Inventory  Current Status: 0-No problems, no rectal bleeding, no discharge, less then 5 bowel movements a day                 Objective   VITAL SIGNS:   Vitals:    02/16/22 0947   BP: 124/67   Pulse: 70   Resp: 16   Temp: 97.7 °F (36.5 °C)   SpO2: 97%  Comment: RA   Weight: 83.7 kg (184 lb 8 oz)   Height: 184.2 cm (72.5\")   PainSc: 0-No pain                Physical Exam  Vitals and nursing note reviewed.   Constitutional:       Appearance: He is well-developed.   HENT:      Head: Normocephalic and atraumatic.   Cardiovascular:      Rate and Rhythm: Normal rate and regular rhythm.      Heart sounds: No murmur heard.      Pulmonary:      Effort: Pulmonary effort is normal.      Breath sounds: No wheezing or rales.   Chest:   Breasts:      Right: No supraclavicular adenopathy.      Left: No supraclavicular adenopathy.       Abdominal:      General: There is no distension.      Palpations: Abdomen is soft.      Tenderness: There is no abdominal tenderness.   Musculoskeletal:         General: No tenderness. Normal range of motion.      Cervical back: Normal range of motion and neck supple. "   Lymphadenopathy:      Cervical: No cervical adenopathy.      Upper Body:      Right upper body: No supraclavicular adenopathy.      Left upper body: No supraclavicular adenopathy.   Skin:     General: Skin is warm and dry.   Neurological:      Mental Status: He is alert and oriented to person, place, and time.      Sensory: No sensory deficit.   Psychiatric:         Behavior: Behavior normal.         Thought Content: Thought content normal.         Judgment: Judgment normal.              The following portions of the patient's history were reviewed and updated as appropriate: allergies, current medications, past family history, past medical history, past social history, past surgical history and problem list.    Diagnoses and all orders for this visit:    Prostate cancer (HCC)    Other orders  -     cefuroxime (CEFTIN) 500 MG tablet; Take 500 mg by mouth 2 (Two) Times a Day.      IMPRESSION:  Prostate cancer, [place] 3+3 = 6, clinical stage I (T1c, N0, M0), diagnosed 6/9/2020 when his PSA was 7.58 ng/ml.  Following placement of gold seed fiducials he experienced prostatitis which is finally cleared after multiple courses of antibiotics, eventually managed by infectious disease with IV Ceftin followed by current 30-day maintenance of oral antibiotics.  He is clinically doing well and ready to begin treatment planning for stereotactic body radiotherapy treatment delivered on CyberKnife.    RECOMMENDATIONS: CT simulation and MRI pelvis will be performed today.  Prostate gland will receive 5 daily fractions of 7 Luque each.         Clement Nicole MD     Approximately 15 minutes was spent with patient and reviewing records.

## 2022-03-01 ENCOUNTER — HOSPITAL ENCOUNTER (OUTPATIENT)
Dept: RADIATION ONCOLOGY | Facility: HOSPITAL | Age: 72
Setting detail: RADIATION/ONCOLOGY SERIES
Discharge: HOME | End: 2022-03-01

## 2022-03-07 ENCOUNTER — TELEPHONE (OUTPATIENT)
Dept: RADIATION ONCOLOGY | Facility: HOSPITAL | Age: 72
End: 2022-03-07

## 2022-03-07 NOTE — TELEPHONE ENCOUNTER
Pt called asking for treatment dates.  I explained the cyberknife machine was getting software update this week and treatment would not take place this week.  I told him to expect a call in the next few days.  Pt verbalized understanding.

## 2022-03-09 ENCOUNTER — DOCUMENTATION (OUTPATIENT)
Dept: RADIATION ONCOLOGY | Facility: HOSPITAL | Age: 72
End: 2022-03-09

## 2022-03-09 PROCEDURE — 77300 RADIATION THERAPY DOSE PLAN: CPT | Performed by: RADIOLOGY

## 2022-03-09 PROCEDURE — 77338 DESIGN MLC DEVICE FOR IMRT: CPT | Performed by: RADIOLOGY

## 2022-03-09 PROCEDURE — 77301 RADIOTHERAPY DOSE PLAN IMRT: CPT | Performed by: RADIOLOGY

## 2022-03-09 NOTE — PROGRESS NOTES
Telephone conversation with patient.    He has treatment planned he is ready to initiate stereotactic body radiotherapy definitive treatment for his prostate cancer.  Patient was called to set up an appointment time.  He has changed his mind and is now preferring active surveillance.  He understands he has low risk prostate cancer; however, over the past 2 years, since diagnosis, his PSA has slowly increased to a value of 9.37.  It be recommended to initiate treatment if his PSA reaches a value of 10 ng/ml or greater.  His last PSA was drawn in August 2021.  He will have another PSA drawn this week and notify us of the result.  If the PSA is lower, he should be fine continuing surveillance.  I did caution him that active surveillance does consist of periodically repeating a prostate biopsy and that his troubles to date have come from E. coli prostatitis from biopsy and fiducial placement.  If his PSA is trending towards or above a value of 10 ng/ml, he will likely decide to go ahead and initiate CyberKnife SBRT, as planned.

## 2022-03-23 ENCOUNTER — DOCUMENTATION (OUTPATIENT)
Dept: RADIATION ONCOLOGY | Facility: HOSPITAL | Age: 72
End: 2022-03-23

## 2022-03-23 NOTE — PROGRESS NOTES
Telephone conversation with patient.  He has decided to proceed with CyberKnife stereotactic body radiotherapy as definitive treatment for his low risk prostate cancer.  PSA drawn yesterday was 9.4 ng/ml, slightly higher than his previous value.  He has no recurrent lower urinary tract symptoms.  He had a negative urinalysis last week at his primary care office.  We will try to schedule his CyberKnife treatment as soon as possible.

## 2022-04-01 ENCOUNTER — TELEPHONE (OUTPATIENT)
Dept: RADIATION ONCOLOGY | Facility: HOSPITAL | Age: 72
End: 2022-04-01

## 2022-04-01 NOTE — TELEPHONE ENCOUNTER
Pt called stating his urine is infected again.  He states he went to the ER and his UA was bad but they did not run a culture.  He states he is having burning and frequency.  He states he wishes to talk to Dr. Nicole and he is going to contact Dr. Purvis.  Message relayed to Dr. Nicole.

## 2022-04-04 ENCOUNTER — HOSPITAL ENCOUNTER (OUTPATIENT)
Dept: RADIATION ONCOLOGY | Facility: HOSPITAL | Age: 72
Setting detail: RADIATION/ONCOLOGY SERIES
Discharge: HOME | End: 2022-04-04

## 2022-04-08 ENCOUNTER — TELEPHONE (OUTPATIENT)
Dept: RADIATION ONCOLOGY | Facility: HOSPITAL | Age: 72
End: 2022-04-08

## 2022-04-08 NOTE — TELEPHONE ENCOUNTER
Pt called stating he needs to speak to Dr. Nicole in person because he needs to ask him tough questions.  He states he has recently taken antibiotics given to him by his PCP for recent UTI.   I explained I would have the unit secretary called pt back with an appointment.   Pt verbalized understanding.

## 2022-04-13 ENCOUNTER — OFFICE VISIT (OUTPATIENT)
Dept: RADIATION ONCOLOGY | Facility: HOSPITAL | Age: 72
End: 2022-04-13

## 2022-04-13 VITALS
OXYGEN SATURATION: 95 % | TEMPERATURE: 98 F | WEIGHT: 184.9 LBS | RESPIRATION RATE: 16 BRPM | HEIGHT: 72 IN | DIASTOLIC BLOOD PRESSURE: 84 MMHG | BODY MASS INDEX: 25.04 KG/M2 | HEART RATE: 87 BPM | SYSTOLIC BLOOD PRESSURE: 131 MMHG

## 2022-04-13 DIAGNOSIS — C61 PROSTATE CANCER: Primary | ICD-10-CM

## 2022-04-13 RX ORDER — HYDROXYZINE HYDROCHLORIDE 25 MG/1
50 TABLET, FILM COATED ORAL
COMMUNITY
Start: 2022-03-08

## 2022-04-13 RX ORDER — CEFUROXIME AXETIL 500 MG/1
500 TABLET ORAL
Status: ON HOLD | COMMUNITY
Start: 2022-01-28 | End: 2022-08-29

## 2022-04-13 RX ORDER — ATORVASTATIN CALCIUM 40 MG/1
TABLET, FILM COATED ORAL
COMMUNITY
Start: 2021-12-08

## 2022-04-13 RX ORDER — NITROFURANTOIN 25; 75 MG/1; MG/1
1 CAPSULE ORAL 2 TIMES DAILY
COMMUNITY
Start: 2022-01-09 | End: 2022-04-13

## 2022-04-13 NOTE — PROGRESS NOTES
RE-EVALUATION    PATIENT:                                                      Talon Anguiano  :                                                          1950  DATE:                          2022   DIAGNOSIS:     Prostate cancer (HCC)  - Stage I (cT1c, cN0, cM0, PSA: 9.3, Grade Group: 1)         BRIEF HISTORY:  The patient is a very pleasant 71 y.o. male  with low risk prostate cancer.  He decided to undergo definitive treatment with stereotactic body radiotherapy.  He has had significant delay due to persistent, recurrent E. coli prostatitis requiring multiple rounds of antibiotics.  He has most recently been considering changing to active surveillance.  PSA value was stabilized at 9.4 ng/ml.  He recently experienced another flare of prostatitis symptoms.  He returned to Dr. Hodgson and has been on Ceftin 500 mg twice daily.  Dysuria has again resolved and he is voiding fairly normally.  He recently saw Dr. Umana at the [facility] for evaluation.  He underwent a PI-RADS contrast-enhanced pelvic MRI.  This reported a PI-RADS 5 lesion in the anterior prostate gland with suspicion of extraprostatic extension.  At this time, it is uncertain if this represents unbiopsied significant neoplasm or prostatic infection/abscess.  Patient believes he will be undergoing transperineal prostate biopsy at the [facility] in the near future.    No Known Allergies    Review of Systems   All other systems reviewed and are negative.              IPSS Questionnaire (AUA-7):  Over the past month…     1)  Incomplete Emptying  How often have you had a sensation of not emptying your bladder?  0 - Not at all   2)  Frequency  How often have you had to urinate less than every two hours? 0 - Not at all   3)  Intermittency  How often have you found you stopped and started again several times when you urinated?  0 - Not at all   4) Urgency  How often have you found it difficult to postpone urination?  0 -  "Not at all   5) Weak Stream  How often have you had a weak urinary stream?  0 - Not at all   6) Straining  How often have you had to push or strain to begin urination?  0 - Not at all   7) Nocturia  How many times did you typically get up at night to urinate?  2 - 2 times   Total Score:  2         Quality of life due to urinary symptoms:  If you were to spend the rest of your life with your urinary condition the way it is now, how would you feel about that? 0-Delighted   Urine Leakage (Incontinence) 0-No Leakage      Sexual Health Inventory  Current Status     1)  How do you rate your confidence that you could achieve and keep an erection? 4-High   2) When you had erections with sexual stimulation, how often were your erections hard enough for penetration (entering your partner)? 5-Almost always or always   3)  During sexual intercourse, how often were you able to maintain your erection after you had penetrated (entered) into your partner? 5-Almost always or always   4) During sexual intercourse, how difficult was it to maintain your erection to completion of intercourse? 5 -almost always or always   5) When you attempted sexual intercourse, how often was it satisfactory to you? 5-Almost always or always   Total Score: 24         Bowel Health Inventory  Current Status: 0-No problems, no rectal bleeding, no discharge, less then 5 bowel movements a day                  Objective   VITAL SIGNS:   Vitals:    04/13/22 1547   BP: 131/84   Pulse: 87   Resp: 16   Temp: 98 °F (36.7 °C)   SpO2: 95%  Comment: RA   Weight: 83.9 kg (184 lb 14.4 oz)   Height: 182.9 cm (72\")   PainSc: 0-No pain        Karnofsky score: 90   KSP %:  90    Physical Exam         The following portions of the patient's history were reviewed and updated as appropriate: allergies, current medications, past family history, past medical history, past social history, past surgical history and problem list.    Diagnoses and all orders for this " visit:    Prostate cancer (HCC)    Other orders  -     atorvastatin (LIPITOR) 40 MG tablet  -     hydrOXYzine (ATARAX) 25 MG tablet; Take 50 mg by mouth.  -     Discontinue: nitrofurantoin, macrocrystal-monohydrate, (MACROBID) 100 MG capsule; Take 1 capsule by mouth 2 (Two) Times a Day.  -     cefuroxime (CEFTIN) 500 MG tablet; Take 500 mg by mouth.      IMPRESSION:  Prostate cancer, [place] 3+3 = 6, clinical stage I (T1c, N0, M0), diagnosed 6/9/2020 when his PSA was 7.58 ng/ml.  Most recent PSA is 9.4 ng/ml.  He has had multiple prostate infections with E. coli bacteria.  He has not yet initiated definitive treatment of the prostate cancer.  Recent pelvic MRI showed a large suspicious PI-RADS 5 lesion in the anterior prostate gland.  This will be evaluated with transperineal MRI directed biopsy to rule out more significant neoplasm versus infection.    RECOMMENDATIONS: Patient will notify me of biopsy results.  We will not proceed with stereotactic body radiotherapy to the prostate at this time and will not be using the previous treatment planning CT or noncontrast MR imaging studies.  If we do eventually treat with stereotactic body radiotherapy or any other radiation modality, he would require repeat imaging.       Clement Nicole MD

## 2022-04-20 ENCOUNTER — TRANSCRIBE ORDERS (OUTPATIENT)
Dept: LAB | Facility: HOSPITAL | Age: 72
End: 2022-04-20

## 2022-04-20 ENCOUNTER — LAB (OUTPATIENT)
Dept: LAB | Facility: HOSPITAL | Age: 72
End: 2022-04-20

## 2022-04-20 DIAGNOSIS — N39.0 URINARY TRACT INFECTION WITHOUT HEMATURIA, SITE UNSPECIFIED: ICD-10-CM

## 2022-04-20 DIAGNOSIS — N41.1 CHRONIC PROSTATITIS: ICD-10-CM

## 2022-04-20 DIAGNOSIS — N41.1 CHRONIC PROSTATITIS: Primary | ICD-10-CM

## 2022-04-20 DIAGNOSIS — B96.29 NON-SHIGA TOXIN-PRODUCING E. COLI: ICD-10-CM

## 2022-04-20 LAB
BACTERIA UR QL AUTO: NORMAL /HPF
BILIRUB UR QL STRIP: NEGATIVE
CLARITY UR: CLEAR
COLOR UR: YELLOW
GLUCOSE UR STRIP-MCNC: NEGATIVE MG/DL
HGB UR QL STRIP.AUTO: NEGATIVE
HYALINE CASTS UR QL AUTO: NORMAL /LPF
KETONES UR QL STRIP: NEGATIVE
LEUKOCYTE ESTERASE UR QL STRIP.AUTO: NEGATIVE
NITRITE UR QL STRIP: NEGATIVE
PH UR STRIP.AUTO: 5.5 [PH] (ref 5–8)
PROT UR QL STRIP: NEGATIVE
RBC # UR STRIP: NORMAL /HPF
REF LAB TEST METHOD: NORMAL
SP GR UR STRIP: 1.02 (ref 1–1.03)
SQUAMOUS #/AREA URNS HPF: NORMAL /HPF
UROBILINOGEN UR QL STRIP: NORMAL
WBC # UR STRIP: NORMAL /HPF

## 2022-04-20 PROCEDURE — 87086 URINE CULTURE/COLONY COUNT: CPT

## 2022-04-20 PROCEDURE — 81001 URINALYSIS AUTO W/SCOPE: CPT

## 2022-04-21 LAB — BACTERIA SPEC AEROBE CULT: NO GROWTH

## 2022-05-02 ENCOUNTER — HOSPITAL ENCOUNTER (OUTPATIENT)
Dept: RADIATION ONCOLOGY | Facility: HOSPITAL | Age: 72
Setting detail: RADIATION/ONCOLOGY SERIES
Discharge: HOME | End: 2022-05-02

## 2022-06-28 ENCOUNTER — TELEPHONE (OUTPATIENT)
Dept: RADIATION ONCOLOGY | Facility: HOSPITAL | Age: 72
End: 2022-06-28

## 2022-06-28 DIAGNOSIS — C61 PROSTATE CANCER: Primary | ICD-10-CM

## 2022-06-28 NOTE — TELEPHONE ENCOUNTER
Pt called stating he spoke with Dr. Nicole last evening and wishes to proceed with his cyberknife treatment as soon as possible.   I explained that Dr. Nicole had told me and I had him scheduled for his re-eval appointment on 8/2/2022.  He states he wants his appointment sooner rather than later.  I explained that Dr. Nicole is on vacation for the next week and a half.  Then he has to plan his treatment.  I explained cyberknife planning is a process and non of this can be rushed.  I explained I gave him the earliest appointment that was available to coordinate with clinic, CT sim and MRI.  Pt states he wishes to have it done sooner.  I explained the best I can offer is if we have a cancellation, I will call him.  Pt verbalized understanding.

## 2022-06-30 ENCOUNTER — TELEPHONE (OUTPATIENT)
Dept: RADIATION ONCOLOGY | Facility: HOSPITAL | Age: 72
End: 2022-06-30

## 2022-06-30 NOTE — TELEPHONE ENCOUNTER
Pt called and left message that he would like to speak to me.  Called pt back and he states he has a trial starting Aug 9 that he cannot miss.  He states it is expected to last 3 weeks.  I explained it should not interfere with his treatment at all.   I explained it takes typically 3 weeks from planning scans until treatment starts.  Pt verbalized understanding.

## 2022-07-12 ENCOUNTER — TELEPHONE (OUTPATIENT)
Dept: RADIATION ONCOLOGY | Facility: HOSPITAL | Age: 72
End: 2022-07-12

## 2022-07-12 NOTE — TELEPHONE ENCOUNTER
Pt called stating he now has plane tickets for a vacation on Aug 29 and will not be available for treatment until Sept 6.  Discussed with Dr. Nicole and called pt back. Explained Dr. Nicole wants to move MRI and CT sim to early Sept when he gets back.   Pt verbalized understanding. I explained I would work on it and call him back tomorrow.

## 2022-08-02 ENCOUNTER — APPOINTMENT (OUTPATIENT)
Dept: MRI IMAGING | Facility: HOSPITAL | Age: 72
End: 2022-08-02

## 2022-08-29 ENCOUNTER — HOSPITAL ENCOUNTER (OUTPATIENT)
Facility: HOSPITAL | Age: 72
Setting detail: OBSERVATION
LOS: 1 days | Discharge: HOME | End: 2022-09-01
Attending: EMERGENCY MEDICINE | Admitting: INTERNAL MEDICINE

## 2022-08-29 ENCOUNTER — APPOINTMENT (OUTPATIENT)
Dept: GENERAL RADIOLOGY | Facility: HOSPITAL | Age: 72
End: 2022-08-29

## 2022-08-29 ENCOUNTER — APPOINTMENT (OUTPATIENT)
Dept: CT IMAGING | Facility: HOSPITAL | Age: 72
End: 2022-08-29

## 2022-08-29 DIAGNOSIS — K57.92 ACUTE DIVERTICULITIS: Primary | ICD-10-CM

## 2022-08-29 DIAGNOSIS — J06.9 VIRAL URI WITH COUGH: ICD-10-CM

## 2022-08-29 PROBLEM — J40 BRONCHITIS: Status: ACTIVE | Noted: 2022-08-29

## 2022-08-29 LAB
ALBUMIN SERPL-MCNC: 4 G/DL (ref 3.5–5.2)
ALBUMIN/GLOB SERPL: 1.3 G/DL
ALP SERPL-CCNC: 100 U/L (ref 39–117)
ALT SERPL W P-5'-P-CCNC: 18 U/L (ref 1–41)
ANION GAP SERPL CALCULATED.3IONS-SCNC: 11 MMOL/L (ref 5–15)
AST SERPL-CCNC: 16 U/L (ref 1–40)
B PARAPERT DNA SPEC QL NAA+PROBE: NOT DETECTED
B PERT DNA SPEC QL NAA+PROBE: NOT DETECTED
BASOPHILS # BLD AUTO: 0.05 10*3/MM3 (ref 0–0.2)
BASOPHILS NFR BLD AUTO: 0.4 % (ref 0–1.5)
BILIRUB SERPL-MCNC: 0.4 MG/DL (ref 0–1.2)
BILIRUB UR QL STRIP: NEGATIVE
BUN SERPL-MCNC: 10 MG/DL (ref 8–23)
BUN/CREAT SERPL: 11.6 (ref 7–25)
C PNEUM DNA NPH QL NAA+NON-PROBE: NOT DETECTED
CALCIUM SPEC-SCNC: 8.6 MG/DL (ref 8.6–10.5)
CHLORIDE SERPL-SCNC: 103 MMOL/L (ref 98–107)
CLARITY UR: CLEAR
CO2 SERPL-SCNC: 25 MMOL/L (ref 22–29)
COLOR UR: YELLOW
CREAT SERPL-MCNC: 0.86 MG/DL (ref 0.76–1.27)
D-LACTATE SERPL-SCNC: 1.2 MMOL/L (ref 0.5–2)
DEPRECATED RDW RBC AUTO: 42.2 FL (ref 37–54)
EGFRCR SERPLBLD CKD-EPI 2021: 90.9 ML/MIN/1.73
EOSINOPHIL # BLD AUTO: 0.14 10*3/MM3 (ref 0–0.4)
EOSINOPHIL NFR BLD AUTO: 1.2 % (ref 0.3–6.2)
ERYTHROCYTE [DISTWIDTH] IN BLOOD BY AUTOMATED COUNT: 13.4 % (ref 12.3–15.4)
FLUAV SUBTYP SPEC NAA+PROBE: NOT DETECTED
FLUAV SUBTYP SPEC NAA+PROBE: NOT DETECTED
FLUBV RNA ISLT QL NAA+PROBE: NOT DETECTED
FLUBV RNA ISLT QL NAA+PROBE: NOT DETECTED
GLOBULIN UR ELPH-MCNC: 3 GM/DL
GLUCOSE SERPL-MCNC: 114 MG/DL (ref 65–99)
GLUCOSE UR STRIP-MCNC: NEGATIVE MG/DL
HADV DNA SPEC NAA+PROBE: NOT DETECTED
HCOV 229E RNA SPEC QL NAA+PROBE: NOT DETECTED
HCOV HKU1 RNA SPEC QL NAA+PROBE: NOT DETECTED
HCOV NL63 RNA SPEC QL NAA+PROBE: NOT DETECTED
HCOV OC43 RNA SPEC QL NAA+PROBE: NOT DETECTED
HCT VFR BLD AUTO: 40 % (ref 37.5–51)
HGB BLD-MCNC: 13.4 G/DL (ref 13–17.7)
HGB UR QL STRIP.AUTO: NEGATIVE
HMPV RNA NPH QL NAA+NON-PROBE: NOT DETECTED
HOLD SPECIMEN: NORMAL
HPIV1 RNA ISLT QL NAA+PROBE: NOT DETECTED
HPIV2 RNA SPEC QL NAA+PROBE: NOT DETECTED
HPIV3 RNA NPH QL NAA+PROBE: NOT DETECTED
HPIV4 P GENE NPH QL NAA+PROBE: NOT DETECTED
IMM GRANULOCYTES # BLD AUTO: 0.07 10*3/MM3 (ref 0–0.05)
IMM GRANULOCYTES NFR BLD AUTO: 0.6 % (ref 0–0.5)
KETONES UR QL STRIP: NEGATIVE
LEUKOCYTE ESTERASE UR QL STRIP.AUTO: NEGATIVE
LIPASE SERPL-CCNC: 35 U/L (ref 13–60)
LYMPHOCYTES # BLD AUTO: 1.78 10*3/MM3 (ref 0.7–3.1)
LYMPHOCYTES NFR BLD AUTO: 15.1 % (ref 19.6–45.3)
M PNEUMO IGG SER IA-ACNC: NOT DETECTED
MCH RBC QN AUTO: 28.9 PG (ref 26.6–33)
MCHC RBC AUTO-ENTMCNC: 33.5 G/DL (ref 31.5–35.7)
MCV RBC AUTO: 86.4 FL (ref 79–97)
MONOCYTES # BLD AUTO: 0.93 10*3/MM3 (ref 0.1–0.9)
MONOCYTES NFR BLD AUTO: 7.9 % (ref 5–12)
NEUTROPHILS NFR BLD AUTO: 74.8 % (ref 42.7–76)
NEUTROPHILS NFR BLD AUTO: 8.78 10*3/MM3 (ref 1.7–7)
NITRITE UR QL STRIP: NEGATIVE
NRBC BLD AUTO-RTO: 0 /100 WBC (ref 0–0.2)
PH UR STRIP.AUTO: 5.5 [PH] (ref 5–8)
PLATELET # BLD AUTO: 293 10*3/MM3 (ref 140–450)
PMV BLD AUTO: 9.5 FL (ref 6–12)
POTASSIUM SERPL-SCNC: 4.1 MMOL/L (ref 3.5–5.2)
PROCALCITONIN SERPL-MCNC: 0.04 NG/ML (ref 0–0.25)
PROT SERPL-MCNC: 7 G/DL (ref 6–8.5)
PROT UR QL STRIP: NEGATIVE
RBC # BLD AUTO: 4.63 10*6/MM3 (ref 4.14–5.8)
RHINOVIRUS RNA SPEC NAA+PROBE: NOT DETECTED
RSV RNA NPH QL NAA+NON-PROBE: NOT DETECTED
SARS-COV-2 RNA NPH QL NAA+NON-PROBE: NOT DETECTED
SARS-COV-2 RNA PNL SPEC NAA+PROBE: NOT DETECTED
SODIUM SERPL-SCNC: 139 MMOL/L (ref 136–145)
SP GR UR STRIP: 1.01 (ref 1–1.03)
UROBILINOGEN UR QL STRIP: NORMAL
WBC NRBC COR # BLD: 11.75 10*3/MM3 (ref 3.4–10.8)
WHOLE BLOOD HOLD COAG: NORMAL
WHOLE BLOOD HOLD SPECIMEN: NORMAL

## 2022-08-29 PROCEDURE — 99284 EMERGENCY DEPT VISIT MOD MDM: CPT

## 2022-08-29 PROCEDURE — C9803 HOPD COVID-19 SPEC COLLECT: HCPCS

## 2022-08-29 PROCEDURE — 96365 THER/PROPH/DIAG IV INF INIT: CPT

## 2022-08-29 PROCEDURE — 36415 COLL VENOUS BLD VENIPUNCTURE: CPT

## 2022-08-29 PROCEDURE — 87040 BLOOD CULTURE FOR BACTERIA: CPT | Performed by: EMERGENCY MEDICINE

## 2022-08-29 PROCEDURE — 0202U NFCT DS 22 TRGT SARS-COV-2: CPT | Performed by: NURSE PRACTITIONER

## 2022-08-29 PROCEDURE — 81003 URINALYSIS AUTO W/O SCOPE: CPT | Performed by: EMERGENCY MEDICINE

## 2022-08-29 PROCEDURE — 85025 COMPLETE CBC W/AUTO DIFF WBC: CPT | Performed by: EMERGENCY MEDICINE

## 2022-08-29 PROCEDURE — 87636 SARSCOV2 & INF A&B AMP PRB: CPT | Performed by: EMERGENCY MEDICINE

## 2022-08-29 PROCEDURE — 25010000002 PIPERACILLIN SOD-TAZOBACTAM PER 1 G: Performed by: EMERGENCY MEDICINE

## 2022-08-29 PROCEDURE — 71045 X-RAY EXAM CHEST 1 VIEW: CPT

## 2022-08-29 PROCEDURE — 74177 CT ABD & PELVIS W/CONTRAST: CPT

## 2022-08-29 PROCEDURE — 83690 ASSAY OF LIPASE: CPT | Performed by: EMERGENCY MEDICINE

## 2022-08-29 PROCEDURE — 83605 ASSAY OF LACTIC ACID: CPT | Performed by: EMERGENCY MEDICINE

## 2022-08-29 PROCEDURE — 84145 PROCALCITONIN (PCT): CPT | Performed by: EMERGENCY MEDICINE

## 2022-08-29 PROCEDURE — 25010000002 IOPAMIDOL 61 % SOLUTION: Performed by: EMERGENCY MEDICINE

## 2022-08-29 PROCEDURE — 99220 PR INITIAL OBSERVATION CARE/DAY 70 MINUTES: CPT | Performed by: INTERNAL MEDICINE

## 2022-08-29 PROCEDURE — 80053 COMPREHEN METABOLIC PANEL: CPT | Performed by: EMERGENCY MEDICINE

## 2022-08-29 RX ORDER — SODIUM CHLORIDE 0.9 % (FLUSH) 0.9 %
10 SYRINGE (ML) INJECTION EVERY 12 HOURS SCHEDULED
Status: DISCONTINUED | OUTPATIENT
Start: 2022-08-29 | End: 2022-09-01 | Stop reason: HOSPADM

## 2022-08-29 RX ORDER — ONDANSETRON 2 MG/ML
4 INJECTION INTRAMUSCULAR; INTRAVENOUS ONCE
Status: DISCONTINUED | OUTPATIENT
Start: 2022-08-29 | End: 2022-09-01 | Stop reason: HOSPADM

## 2022-08-29 RX ORDER — ONDANSETRON 2 MG/ML
4 INJECTION INTRAMUSCULAR; INTRAVENOUS EVERY 6 HOURS PRN
Status: DISCONTINUED | OUTPATIENT
Start: 2022-08-29 | End: 2022-09-01 | Stop reason: HOSPADM

## 2022-08-29 RX ORDER — SODIUM CHLORIDE 0.9 % (FLUSH) 0.9 %
10 SYRINGE (ML) INJECTION AS NEEDED
Status: DISCONTINUED | OUTPATIENT
Start: 2022-08-29 | End: 2022-09-01 | Stop reason: HOSPADM

## 2022-08-29 RX ORDER — UBIDECARENONE 100 MG
100 CAPSULE ORAL DAILY
COMMUNITY

## 2022-08-29 RX ORDER — TAMSULOSIN HYDROCHLORIDE 0.4 MG/1
0.4 CAPSULE ORAL NIGHTLY
Status: DISCONTINUED | OUTPATIENT
Start: 2022-08-29 | End: 2022-09-01 | Stop reason: HOSPADM

## 2022-08-29 RX ORDER — SODIUM CHLORIDE 9 MG/ML
10 INJECTION INTRAVENOUS AS NEEDED
Status: DISCONTINUED | OUTPATIENT
Start: 2022-08-29 | End: 2022-09-01 | Stop reason: HOSPADM

## 2022-08-29 RX ORDER — HYDROXYZINE HYDROCHLORIDE 25 MG/1
25 TABLET, FILM COATED ORAL NIGHTLY PRN
Status: DISCONTINUED | OUTPATIENT
Start: 2022-08-29 | End: 2022-09-01 | Stop reason: HOSPADM

## 2022-08-29 RX ORDER — LEVOTHYROXINE SODIUM 88 UG/1
88 TABLET ORAL
Status: DISCONTINUED | OUTPATIENT
Start: 2022-08-30 | End: 2022-09-01 | Stop reason: HOSPADM

## 2022-08-29 RX ORDER — SODIUM CHLORIDE, SODIUM LACTATE, POTASSIUM CHLORIDE, CALCIUM CHLORIDE 600; 310; 30; 20 MG/100ML; MG/100ML; MG/100ML; MG/100ML
75 INJECTION, SOLUTION INTRAVENOUS CONTINUOUS
Status: ACTIVE | OUTPATIENT
Start: 2022-08-29 | End: 2022-08-30

## 2022-08-29 RX ORDER — ATORVASTATIN CALCIUM 40 MG/1
40 TABLET, FILM COATED ORAL DAILY
Status: DISCONTINUED | OUTPATIENT
Start: 2022-08-30 | End: 2022-08-29

## 2022-08-29 RX ORDER — BENZONATATE 100 MG/1
100 CAPSULE ORAL ONCE
Status: COMPLETED | OUTPATIENT
Start: 2022-08-29 | End: 2022-08-29

## 2022-08-29 RX ORDER — ATORVASTATIN CALCIUM 40 MG/1
40 TABLET, FILM COATED ORAL NIGHTLY
Status: DISCONTINUED | OUTPATIENT
Start: 2022-08-29 | End: 2022-09-01 | Stop reason: HOSPADM

## 2022-08-29 RX ORDER — CHOLECALCIFEROL (VITAMIN D3) 125 MCG
5 CAPSULE ORAL NIGHTLY PRN
Status: DISCONTINUED | OUTPATIENT
Start: 2022-08-29 | End: 2022-09-01 | Stop reason: HOSPADM

## 2022-08-29 RX ORDER — ONDANSETRON 4 MG/1
4 TABLET, FILM COATED ORAL EVERY 6 HOURS PRN
Status: DISCONTINUED | OUTPATIENT
Start: 2022-08-29 | End: 2022-09-01 | Stop reason: HOSPADM

## 2022-08-29 RX ADMIN — BENZONATATE 100 MG: 100 CAPSULE ORAL at 20:48

## 2022-08-29 RX ADMIN — Medication 10 ML: at 22:53

## 2022-08-29 RX ADMIN — IOPAMIDOL 100 ML: 612 INJECTION, SOLUTION INTRAVENOUS at 18:55

## 2022-08-29 RX ADMIN — TAZOBACTAM SODIUM AND PIPERACILLIN SODIUM 3.38 G: 375; 3 INJECTION, SOLUTION INTRAVENOUS at 20:48

## 2022-08-29 RX ADMIN — SODIUM CHLORIDE, POTASSIUM CHLORIDE, SODIUM LACTATE AND CALCIUM CHLORIDE 75 ML/HR: 600; 310; 30; 20 INJECTION, SOLUTION INTRAVENOUS at 22:53

## 2022-08-29 RX ADMIN — GUAIFENESIN 200 MG: 200 SOLUTION ORAL at 22:53

## 2022-08-29 RX ADMIN — ATORVASTATIN CALCIUM 40 MG: 40 TABLET, FILM COATED ORAL at 22:53

## 2022-08-29 RX ADMIN — SODIUM CHLORIDE 500 ML: 9 INJECTION, SOLUTION INTRAVENOUS at 19:55

## 2022-08-29 RX ADMIN — HYDROXYZINE HYDROCHLORIDE 25 MG: 25 TABLET, FILM COATED ORAL at 22:53

## 2022-08-29 RX ADMIN — TAMSULOSIN HYDROCHLORIDE 0.4 MG: 0.4 CAPSULE ORAL at 22:53

## 2022-08-30 LAB
ANION GAP SERPL CALCULATED.3IONS-SCNC: 7 MMOL/L (ref 5–15)
BASOPHILS # BLD AUTO: 0.04 10*3/MM3 (ref 0–0.2)
BASOPHILS NFR BLD AUTO: 0.3 % (ref 0–1.5)
BUN SERPL-MCNC: 9 MG/DL (ref 8–23)
BUN/CREAT SERPL: 9.3 (ref 7–25)
CALCIUM SPEC-SCNC: 8.7 MG/DL (ref 8.6–10.5)
CHLORIDE SERPL-SCNC: 103 MMOL/L (ref 98–107)
CO2 SERPL-SCNC: 28 MMOL/L (ref 22–29)
CREAT SERPL-MCNC: 0.97 MG/DL (ref 0.76–1.27)
DEPRECATED RDW RBC AUTO: 42.3 FL (ref 37–54)
EGFRCR SERPLBLD CKD-EPI 2021: 81.9 ML/MIN/1.73
EOSINOPHIL # BLD AUTO: 0.2 10*3/MM3 (ref 0–0.4)
EOSINOPHIL NFR BLD AUTO: 1.5 % (ref 0.3–6.2)
ERYTHROCYTE [DISTWIDTH] IN BLOOD BY AUTOMATED COUNT: 13.3 % (ref 12.3–15.4)
GLUCOSE SERPL-MCNC: 97 MG/DL (ref 65–99)
HCT VFR BLD AUTO: 38.7 % (ref 37.5–51)
HGB BLD-MCNC: 12.8 G/DL (ref 13–17.7)
IMM GRANULOCYTES # BLD AUTO: 0.08 10*3/MM3 (ref 0–0.05)
IMM GRANULOCYTES NFR BLD AUTO: 0.6 % (ref 0–0.5)
INR PPP: 1.05 (ref 0.84–1.13)
LYMPHOCYTES # BLD AUTO: 2.01 10*3/MM3 (ref 0.7–3.1)
LYMPHOCYTES NFR BLD AUTO: 15.1 % (ref 19.6–45.3)
MCH RBC QN AUTO: 28.6 PG (ref 26.6–33)
MCHC RBC AUTO-ENTMCNC: 33.1 G/DL (ref 31.5–35.7)
MCV RBC AUTO: 86.6 FL (ref 79–97)
MONOCYTES # BLD AUTO: 0.85 10*3/MM3 (ref 0.1–0.9)
MONOCYTES NFR BLD AUTO: 6.4 % (ref 5–12)
NEUTROPHILS NFR BLD AUTO: 10.13 10*3/MM3 (ref 1.7–7)
NEUTROPHILS NFR BLD AUTO: 76.1 % (ref 42.7–76)
NRBC BLD AUTO-RTO: 0 /100 WBC (ref 0–0.2)
PLATELET # BLD AUTO: 267 10*3/MM3 (ref 140–450)
PMV BLD AUTO: 9.6 FL (ref 6–12)
POTASSIUM SERPL-SCNC: 4.6 MMOL/L (ref 3.5–5.2)
PROTHROMBIN TIME: 13.6 SECONDS (ref 11.4–14.4)
RBC # BLD AUTO: 4.47 10*6/MM3 (ref 4.14–5.8)
SODIUM SERPL-SCNC: 138 MMOL/L (ref 136–145)
WBC NRBC COR # BLD: 13.31 10*3/MM3 (ref 3.4–10.8)

## 2022-08-30 PROCEDURE — 99225 PR SBSQ OBSERVATION CARE/DAY 25 MINUTES: CPT | Performed by: INTERNAL MEDICINE

## 2022-08-30 PROCEDURE — 25010000002 PIPERACILLIN SOD-TAZOBACTAM PER 1 G: Performed by: NURSE PRACTITIONER

## 2022-08-30 PROCEDURE — G0378 HOSPITAL OBSERVATION PER HR: HCPCS

## 2022-08-30 PROCEDURE — 80048 BASIC METABOLIC PNL TOTAL CA: CPT | Performed by: NURSE PRACTITIONER

## 2022-08-30 PROCEDURE — 96366 THER/PROPH/DIAG IV INF ADDON: CPT

## 2022-08-30 PROCEDURE — 85025 COMPLETE CBC W/AUTO DIFF WBC: CPT | Performed by: NURSE PRACTITIONER

## 2022-08-30 PROCEDURE — 85610 PROTHROMBIN TIME: CPT | Performed by: NURSE PRACTITIONER

## 2022-08-30 RX ORDER — BENZONATATE 100 MG/1
200 CAPSULE ORAL 3 TIMES DAILY PRN
Status: DISCONTINUED | OUTPATIENT
Start: 2022-08-30 | End: 2022-09-01 | Stop reason: HOSPADM

## 2022-08-30 RX ORDER — GUAIFENESIN AND CODEINE PHOSPHATE 100; 10 MG/5ML; MG/5ML
5 SOLUTION ORAL EVERY 4 HOURS PRN
Status: DISCONTINUED | OUTPATIENT
Start: 2022-08-30 | End: 2022-09-01 | Stop reason: HOSPADM

## 2022-08-30 RX ADMIN — TAZOBACTAM SODIUM AND PIPERACILLIN SODIUM 3.38 G: 375; 3 INJECTION, SOLUTION INTRAVENOUS at 09:21

## 2022-08-30 RX ADMIN — GUAIFENESIN 200 MG: 200 SOLUTION ORAL at 06:06

## 2022-08-30 RX ADMIN — LEVOTHYROXINE SODIUM 88 MCG: 88 TABLET ORAL at 06:06

## 2022-08-30 RX ADMIN — HYDROXYZINE HYDROCHLORIDE 25 MG: 25 TABLET, FILM COATED ORAL at 22:17

## 2022-08-30 RX ADMIN — ATORVASTATIN CALCIUM 40 MG: 40 TABLET, FILM COATED ORAL at 20:42

## 2022-08-30 RX ADMIN — GUAIFENESIN AND CODEINE PHOSPHATE 5 ML: 10; 100 LIQUID ORAL at 12:14

## 2022-08-30 RX ADMIN — BENZONATATE 200 MG: 100 CAPSULE ORAL at 03:35

## 2022-08-30 RX ADMIN — GUAIFENESIN AND CODEINE PHOSPHATE 5 ML: 10; 100 LIQUID ORAL at 17:37

## 2022-08-30 RX ADMIN — TAMSULOSIN HYDROCHLORIDE 0.4 MG: 0.4 CAPSULE ORAL at 20:42

## 2022-08-30 RX ADMIN — TAZOBACTAM SODIUM AND PIPERACILLIN SODIUM 3.38 G: 375; 3 INJECTION, SOLUTION INTRAVENOUS at 02:17

## 2022-08-30 RX ADMIN — Medication 10 ML: at 20:42

## 2022-08-30 RX ADMIN — GUAIFENESIN AND CODEINE PHOSPHATE 5 ML: 10; 100 LIQUID ORAL at 21:38

## 2022-08-30 RX ADMIN — TAZOBACTAM SODIUM AND PIPERACILLIN SODIUM 3.38 G: 375; 3 INJECTION, SOLUTION INTRAVENOUS at 17:34

## 2022-08-31 LAB
ALBUMIN SERPL-MCNC: 3.5 G/DL (ref 3.5–5.2)
ALBUMIN/GLOB SERPL: 1.2 G/DL
ALP SERPL-CCNC: 94 U/L (ref 39–117)
ALT SERPL W P-5'-P-CCNC: 15 U/L (ref 1–41)
ANION GAP SERPL CALCULATED.3IONS-SCNC: 10 MMOL/L (ref 5–15)
AST SERPL-CCNC: 17 U/L (ref 1–40)
BILIRUB SERPL-MCNC: 0.5 MG/DL (ref 0–1.2)
BUN SERPL-MCNC: 9 MG/DL (ref 8–23)
BUN/CREAT SERPL: 9 (ref 7–25)
CALCIUM SPEC-SCNC: 8.4 MG/DL (ref 8.6–10.5)
CHLORIDE SERPL-SCNC: 102 MMOL/L (ref 98–107)
CO2 SERPL-SCNC: 27 MMOL/L (ref 22–29)
CREAT SERPL-MCNC: 1 MG/DL (ref 0.76–1.27)
DEPRECATED RDW RBC AUTO: 43.3 FL (ref 37–54)
EGFRCR SERPLBLD CKD-EPI 2021: 79 ML/MIN/1.73
ERYTHROCYTE [DISTWIDTH] IN BLOOD BY AUTOMATED COUNT: 13.2 % (ref 12.3–15.4)
GLOBULIN UR ELPH-MCNC: 2.9 GM/DL
GLUCOSE SERPL-MCNC: 117 MG/DL (ref 65–99)
HCT VFR BLD AUTO: 38.5 % (ref 37.5–51)
HGB BLD-MCNC: 12.5 G/DL (ref 13–17.7)
MCH RBC QN AUTO: 28.7 PG (ref 26.6–33)
MCHC RBC AUTO-ENTMCNC: 32.5 G/DL (ref 31.5–35.7)
MCV RBC AUTO: 88.3 FL (ref 79–97)
PLATELET # BLD AUTO: 274 10*3/MM3 (ref 140–450)
PMV BLD AUTO: 9.8 FL (ref 6–12)
POTASSIUM SERPL-SCNC: 3.7 MMOL/L (ref 3.5–5.2)
PROT SERPL-MCNC: 6.4 G/DL (ref 6–8.5)
RBC # BLD AUTO: 4.36 10*6/MM3 (ref 4.14–5.8)
SODIUM SERPL-SCNC: 139 MMOL/L (ref 136–145)
WBC NRBC COR # BLD: 8.75 10*3/MM3 (ref 3.4–10.8)

## 2022-08-31 PROCEDURE — 85027 COMPLETE CBC AUTOMATED: CPT | Performed by: INTERNAL MEDICINE

## 2022-08-31 PROCEDURE — 25010000002 PIPERACILLIN SOD-TAZOBACTAM PER 1 G: Performed by: INTERNAL MEDICINE

## 2022-08-31 PROCEDURE — 25010000002 PIPERACILLIN SOD-TAZOBACTAM PER 1 G: Performed by: NURSE PRACTITIONER

## 2022-08-31 PROCEDURE — G0378 HOSPITAL OBSERVATION PER HR: HCPCS

## 2022-08-31 PROCEDURE — 80053 COMPREHEN METABOLIC PANEL: CPT | Performed by: INTERNAL MEDICINE

## 2022-08-31 PROCEDURE — 99225 PR SBSQ OBSERVATION CARE/DAY 25 MINUTES: CPT | Performed by: INTERNAL MEDICINE

## 2022-08-31 PROCEDURE — 96366 THER/PROPH/DIAG IV INF ADDON: CPT

## 2022-08-31 RX ORDER — AMOXICILLIN AND CLAVULANATE POTASSIUM 562.5; 437.5; 62.5 MG/1; MG/1; MG/1
2 TABLET, FILM COATED, EXTENDED RELEASE ORAL EVERY 12 HOURS SCHEDULED
Status: DISCONTINUED | OUTPATIENT
Start: 2022-08-31 | End: 2022-08-31

## 2022-08-31 RX ORDER — DIPHENOXYLATE HYDROCHLORIDE AND ATROPINE SULFATE 2.5; .025 MG/1; MG/1
1 TABLET ORAL DAILY
Status: DISCONTINUED | OUTPATIENT
Start: 2022-08-31 | End: 2022-09-01 | Stop reason: HOSPADM

## 2022-08-31 RX ADMIN — TAMSULOSIN HYDROCHLORIDE 0.4 MG: 0.4 CAPSULE ORAL at 20:22

## 2022-08-31 RX ADMIN — LEVOTHYROXINE SODIUM 88 MCG: 88 TABLET ORAL at 05:32

## 2022-08-31 RX ADMIN — Medication 10 ML: at 20:22

## 2022-08-31 RX ADMIN — GUAIFENESIN AND CODEINE PHOSPHATE 5 ML: 10; 100 LIQUID ORAL at 15:48

## 2022-08-31 RX ADMIN — TAZOBACTAM SODIUM AND PIPERACILLIN SODIUM 3.38 G: 375; 3 INJECTION, SOLUTION INTRAVENOUS at 01:21

## 2022-08-31 RX ADMIN — TAZOBACTAM SODIUM AND PIPERACILLIN SODIUM 3.38 G: 375; 3 INJECTION, SOLUTION INTRAVENOUS at 11:52

## 2022-08-31 RX ADMIN — ATORVASTATIN CALCIUM 40 MG: 40 TABLET, FILM COATED ORAL at 20:22

## 2022-08-31 RX ADMIN — MULTIVITAMIN TABLET 1 TABLET: TABLET at 15:48

## 2022-08-31 RX ADMIN — GUAIFENESIN AND CODEINE PHOSPHATE 5 ML: 10; 100 LIQUID ORAL at 09:52

## 2022-08-31 RX ADMIN — TAZOBACTAM SODIUM AND PIPERACILLIN SODIUM 3.38 G: 375; 3 INJECTION, SOLUTION INTRAVENOUS at 20:22

## 2022-09-01 ENCOUNTER — READMISSION MANAGEMENT (OUTPATIENT)
Dept: CALL CENTER | Facility: HOSPITAL | Age: 72
End: 2022-09-01

## 2022-09-01 VITALS
SYSTOLIC BLOOD PRESSURE: 120 MMHG | WEIGHT: 186 LBS | HEIGHT: 72 IN | DIASTOLIC BLOOD PRESSURE: 96 MMHG | HEART RATE: 82 BPM | RESPIRATION RATE: 16 BRPM | OXYGEN SATURATION: 96 % | TEMPERATURE: 97.9 F | BODY MASS INDEX: 25.19 KG/M2

## 2022-09-01 LAB
ANION GAP SERPL CALCULATED.3IONS-SCNC: 8 MMOL/L (ref 5–15)
BASOPHILS # BLD AUTO: 0.05 10*3/MM3 (ref 0–0.2)
BASOPHILS NFR BLD AUTO: 0.6 % (ref 0–1.5)
BUN SERPL-MCNC: 10 MG/DL (ref 8–23)
BUN/CREAT SERPL: 9.5 (ref 7–25)
CALCIUM SPEC-SCNC: 8.4 MG/DL (ref 8.6–10.5)
CHLORIDE SERPL-SCNC: 102 MMOL/L (ref 98–107)
CO2 SERPL-SCNC: 27 MMOL/L (ref 22–29)
CREAT SERPL-MCNC: 1.05 MG/DL (ref 0.76–1.27)
DEPRECATED RDW RBC AUTO: 42 FL (ref 37–54)
EGFRCR SERPLBLD CKD-EPI 2021: 74.5 ML/MIN/1.73
EOSINOPHIL # BLD AUTO: 0.28 10*3/MM3 (ref 0–0.4)
EOSINOPHIL NFR BLD AUTO: 3.4 % (ref 0.3–6.2)
ERYTHROCYTE [DISTWIDTH] IN BLOOD BY AUTOMATED COUNT: 13.3 % (ref 12.3–15.4)
GLUCOSE SERPL-MCNC: 98 MG/DL (ref 65–99)
HCT VFR BLD AUTO: 37.2 % (ref 37.5–51)
HGB BLD-MCNC: 12.4 G/DL (ref 13–17.7)
IMM GRANULOCYTES # BLD AUTO: 0.05 10*3/MM3 (ref 0–0.05)
IMM GRANULOCYTES NFR BLD AUTO: 0.6 % (ref 0–0.5)
LYMPHOCYTES # BLD AUTO: 2.26 10*3/MM3 (ref 0.7–3.1)
LYMPHOCYTES NFR BLD AUTO: 27.1 % (ref 19.6–45.3)
MCH RBC QN AUTO: 29 PG (ref 26.6–33)
MCHC RBC AUTO-ENTMCNC: 33.3 G/DL (ref 31.5–35.7)
MCV RBC AUTO: 87.1 FL (ref 79–97)
MONOCYTES # BLD AUTO: 0.66 10*3/MM3 (ref 0.1–0.9)
MONOCYTES NFR BLD AUTO: 7.9 % (ref 5–12)
NEUTROPHILS NFR BLD AUTO: 5.05 10*3/MM3 (ref 1.7–7)
NEUTROPHILS NFR BLD AUTO: 60.4 % (ref 42.7–76)
NRBC BLD AUTO-RTO: 0 /100 WBC (ref 0–0.2)
PLATELET # BLD AUTO: 266 10*3/MM3 (ref 140–450)
PMV BLD AUTO: 9.8 FL (ref 6–12)
POTASSIUM SERPL-SCNC: 4.1 MMOL/L (ref 3.5–5.2)
RBC # BLD AUTO: 4.27 10*6/MM3 (ref 4.14–5.8)
SODIUM SERPL-SCNC: 137 MMOL/L (ref 136–145)
WBC NRBC COR # BLD: 8.35 10*3/MM3 (ref 3.4–10.8)

## 2022-09-01 PROCEDURE — 25010000002 ERTAPENEM PER 500 MG: Performed by: INTERNAL MEDICINE

## 2022-09-01 PROCEDURE — G0378 HOSPITAL OBSERVATION PER HR: HCPCS

## 2022-09-01 PROCEDURE — 85025 COMPLETE CBC W/AUTO DIFF WBC: CPT | Performed by: INTERNAL MEDICINE

## 2022-09-01 PROCEDURE — 96366 THER/PROPH/DIAG IV INF ADDON: CPT

## 2022-09-01 PROCEDURE — 80048 BASIC METABOLIC PNL TOTAL CA: CPT | Performed by: INTERNAL MEDICINE

## 2022-09-01 PROCEDURE — 25010000002 PIPERACILLIN SOD-TAZOBACTAM PER 1 G: Performed by: INTERNAL MEDICINE

## 2022-09-01 PROCEDURE — 96367 TX/PROPH/DG ADDL SEQ IV INF: CPT

## 2022-09-01 PROCEDURE — 99217 PR OBSERVATION CARE DISCHARGE MANAGEMENT: CPT | Performed by: INTERNAL MEDICINE

## 2022-09-01 RX ORDER — GUAIFENESIN AND CODEINE PHOSPHATE 100; 10 MG/5ML; MG/5ML
5 SOLUTION ORAL EVERY 4 HOURS PRN
Qty: 118 ML | Refills: 0 | Status: SHIPPED | OUTPATIENT
Start: 2022-09-01 | End: 2022-11-03

## 2022-09-01 RX ADMIN — TAZOBACTAM SODIUM AND PIPERACILLIN SODIUM 3.38 G: 375; 3 INJECTION, SOLUTION INTRAVENOUS at 03:08

## 2022-09-01 RX ADMIN — LEVOTHYROXINE SODIUM 88 MCG: 88 TABLET ORAL at 05:18

## 2022-09-01 RX ADMIN — ERTAPENEM 1 G: 1 INJECTION INTRAMUSCULAR; INTRAVENOUS at 09:39

## 2022-09-01 RX ADMIN — MULTIVITAMIN TABLET 1 TABLET: TABLET at 07:59

## 2022-09-01 RX ADMIN — GUAIFENESIN AND CODEINE PHOSPHATE 5 ML: 10; 100 LIQUID ORAL at 00:03

## 2022-09-01 RX ADMIN — HYDROXYZINE HYDROCHLORIDE 25 MG: 25 TABLET, FILM COATED ORAL at 00:03

## 2022-09-01 NOTE — PROGRESS NOTES
"Patient Name:  Talon Anguiano  YOB: 1950  [id number]    Surgery Progress Note    Date of visit: 9/1/2022    Subjective   No abdominal pain. No fevers/chills. No nausea/vomiting. +BM/flatus         Objective       /96 (BP Location: Right arm, Patient Position: Sitting)   Pulse 82   Temp 97.9 °F (36.6 °C) (Oral)   Resp 16   Ht 182.9 cm (72\")   Wt 84.4 kg (186 lb)   SpO2 96%   BMI 25.23 kg/m²     Intake/Output Summary (Last 24 hours) at 9/1/2022 1008  Last data filed at 9/1/2022 0700  Gross per 24 hour   Intake 1600 ml   Output --   Net 1600 ml       CV:  Rhythm regular and rate regular  L:  Clear to auscultation bilaterally  Abd:  Bowel sounds positive, soft, nontender/nondistended  Ext:  No cyanosis, clubbing, edema    Recent labs and imaging that are back at this time have been reviewed.   WBC 8.4  Hgb 12.4  Cr 1.05         Assessment & Plan     Patient with acute diverticulitis  Pain control  IV abx per ID  DC planning OK from surgical perspective; should follow with me in 1-2 weeks to set up outpatient colonoscopy        Tony Arrington MD  9/1/2022  10:08 EDT      "

## 2022-09-01 NOTE — DISCHARGE SUMMARY
[facility] Medicine Services  DISCHARGE SUMMARY    Patient Name: Talon Anguiano  : 1950  MRN: [id number]    Date of Admission: 2022  7:42 PM  Date of Discharge:  2022  Primary Care Physician: Didier Ortiz MD    Consults     Date and Time Order Name Status Description    2022  3:09 PM Inpatient Infectious Diseases Consult Completed     2022 12:34 AM Inpatient General Surgery Consult Completed           Hospital Course     Presenting Problem:   Acute diverticulitis [K57.92]    Active Hospital Problems    Diagnosis  POA   • Diverticulitis [K57.92]  Yes   • Acute diverticulitis [K57.92]  Yes   • Bronchitis [J40]  Yes   • Prostate cancer (HCC) [C61]  Yes      Resolved Hospital Problems   No resolved problems to display.          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        Diverticulitis with abscess  History of diverticulosis  -- CT abdomen pelvis shows moderately extensive mid sigmoid diverticulitis with minimal extraluminal free air and questionable early development of small abscess.  No free intra-abdominal air nor well-defined drainable abscess seen.  -- has been on Zosyn, will transition to daily IV Ertapenem per Dr. Strickland. Follow up next week with ID.  -- needs repeat CT A/P in 1-2 weeks to ensure resolution/improvement of abscess  -- BC x2 NGTD  -- Pain control  -- Consulted surgery  -- advanced diet and pt tolerating regular diet well  -- Leukocytosis has now resolved     Bronchitis  --respiratory panel unremarkable  --albuterol inhaler  --supportive care  -- Tussionex Q4 hours per pt request     History of prostate cancer     Hypothyroidism  -- levothyroxine               Discharge Follow Up Recommendations for outpatient labs/diagnostics:    "Follow up with PCP within one week  Follow up with ID in 1-2 weeks  Follow up with Surgery/Dr. Arrington as per his instructions    Day of Discharge     HPI:   Doing well today, no new issues overnight. States that he is bored in the hospital \"which is a good sign\".     Review of Systems  Gen- No fevers, chills  CV- No chest pain, palpitations  Resp- No cough, dyspnea  GI- No N/V/D, abd pain        Vital Signs:   Temp:  [97.8 °F (36.6 °C)-98.2 °F (36.8 °C)] 97.9 °F (36.6 °C)  Heart Rate:  [] 82  Resp:  [16-18] 16  BP: (111-156)/(67-96) 120/96      Physical Exam:  Constitutional: No acute distress, awake, alert  HENT: NCAT, mucous membranes moist  Respiratory: Clear to auscultation bilaterally, respiratory effort normal   Cardiovascular: RRR, no murmurs, rubs, or gallops  Gastrointestinal: Positive bowel sounds, soft, nontender, nondistended  Musculoskeletal: No bilateral ankle edema  Psychiatric: Appropriate affect, cooperative  Neurologic: Oriented x 3, strength symmetric in all extremities, Cranial Nerves grossly intact to confrontation, speech clear  Skin: No     Pertinent  and/or Most Recent Results     LAB RESULTS:      Lab 09/01/22  0705 08/31/22  1343 08/30/22  0917 08/29/22  2013 08/29/22  1632   WBC 8.35 8.75 13.31*  --  11.75*   HEMOGLOBIN 12.4* 12.5* 12.8*  --  13.4   HEMATOCRIT 37.2* 38.5 38.7  --  40.0   PLATELETS 266 274 267  --  293   NEUTROS ABS 5.05  --  10.13*  --  8.78*   IMMATURE GRANS (ABS) 0.05  --  0.08*  --  0.07*   LYMPHS ABS 2.26  --  2.01  --  1.78   MONOS ABS 0.66  --  0.85  --  0.93*   EOS ABS 0.28  --  0.20  --  0.14   MCV 87.1 88.3 86.6  --  86.4   PROCALCITONIN  --   --   --  0.04  --    LACTATE  --   --   --   --  1.2   PROTIME  --   --  13.6  --   --          Lab 09/01/22  0705 08/31/22  1343 08/30/22  0917 08/29/22  1632   SODIUM 137 139 138 139   POTASSIUM 4.1 3.7 4.6 4.1   CHLORIDE 102 102 103 103   CO2 27.0 27.0 28.0 25.0   ANION GAP 8.0 10.0 7.0 11.0   BUN 10 9 9 10 "   CREATININE 1.05 1.00 0.97 0.86   EGFR 74.5 79.0 81.9 90.9   GLUCOSE 98 117* 97 114*   CALCIUM 8.4* 8.4* 8.7 8.6         Lab 08/31/22  1343 08/29/22  1632   TOTAL PROTEIN 6.4 7.0   ALBUMIN 3.50 4.00   GLOBULIN 2.9 3.0   ALT (SGPT) 15 18   AST (SGOT) 17 16   BILIRUBIN 0.5 0.4   ALK PHOS 94 100   LIPASE  --  35         Lab 08/30/22  0917   PROTIME 13.6   INR 1.05                 Brief Urine Lab Results  (Last result in the past 365 days)      Color   Clarity   Blood   Leuk Est   Nitrite   Protein   CREAT   Urine HCG        08/29/22 1639 Yellow   Clear   Negative   Negative   Negative   Negative               Microbiology Results (last 10 days)     Procedure Component Value - Date/Time    Blood Culture - Blood, Arm, Right [[id number]]  (Normal) Collected: 08/29/22 2010    Lab Status: Preliminary result Specimen: Blood from Arm, Right Updated: 08/31/22 2131     Blood Culture No growth at 2 days    Blood Culture - Blood, Arm, Right [[id number]]  (Normal) Collected: 08/29/22 2000    Lab Status: Preliminary result Specimen: Blood from Arm, Right Updated: 08/31/22 2131     Blood Culture No growth at 2 days    COVID-19 and FLU A/B PCR - Swab, Nasopharynx [[id number]]  (Normal) Collected: 08/29/22 1924    Lab Status: Final result Specimen: Swab from Nasopharynx Updated: 08/29/22 2107     COVID19 Not Detected     Influenza A PCR Not Detected     Influenza B PCR Not Detected    Narrative:      Fact sheet for providers: https://www.fda.gov/media/352901/download    Fact sheet for patients: https://www.fda.gov/media/020405/download    Test performed by PCR.    Respiratory Panel PCR w/COVID-19(SARS-CoV-2) [facility] In-House, NP Swab in UTM/VTM, 3-4 HR TAT - Swab, Nasopharynx [[id number]]  (Normal) Collected: 08/29/22 1924    Lab Status: Final result Specimen: Swab from Nasopharynx Updated: 08/29/22 2234     ADENOVIRUS, PCR Not Detected     Coronavirus 229E Not Detected     Coronavirus HKU1 Not Detected      Coronavirus NL63 Not Detected     Coronavirus OC43 Not Detected     COVID19 Not Detected     Human Metapneumovirus Not Detected     Human Rhinovirus/Enterovirus Not Detected     Influenza A PCR Not Detected     Influenza B PCR Not Detected     Parainfluenza Virus 1 Not Detected     Parainfluenza Virus 2 Not Detected     Parainfluenza Virus 3 Not Detected     Parainfluenza Virus 4 Not Detected     RSV, PCR Not Detected     Bordetella pertussis pcr Not Detected     Bordetella parapertussis PCR Not Detected     Chlamydophila pneumoniae PCR Not Detected     Mycoplasma pneumo by PCR Not Detected    Narrative:      In the setting of a positive respiratory panel with a viral infection PLUS a negative procalcitonin without other underlying concern for bacterial infection, consider observing off antibiotics or discontinuation of antibiotics and continue supportive care. If the respiratory panel is positive for atypical bacterial infection (Bordetella pertussis, Chlamydophila pneumoniae, or Mycoplasma pneumoniae), consider antibiotic de-escalation to target atypical bacterial infection.          CT Abdomen Pelvis With Contrast    Result Date: 8/29/2022  DATE OF EXAM: 8/29/2022 6:43 PM  PROCEDURE: CT ABDOMEN PELVIS W CONTRAST-  INDICATIONS: RLQ pain  COMPARISON: No comparisons available.  TECHNIQUE: Routine transaxial slices were obtained through the abdomen and pelvis after the intravenous administration of 100 mL of Isovue 300. Reconstructed coronal and sagittal images were also obtained. Automated exposure control and iterative construction methods were used.  The radiation dose reduction device was turned on for each scan per the ALARA (As Low as Reasonably Achievable) protocol.  FINDINGS: There is an approximately 8 cm segment of thickened/inflamed mid sigmoid, with what appear to be a couple of bubbles of extraluminal air. There may be a very small area of developing abscess 15 mm in diameter on axial image 111 series 3,  but no evidence of a well-defined, drainable abscess is seen. No free air is identified. There is a trace amount of intrapelvic free fluid.  Elsewhere, the included lower lungs appear clear. There is postcholecystectomy dilatation of the common bile duct and intrahepatic ducts. Liver otherwise appears unremarkable. Spleen is not enlarged. Pancreas adrenal glands, and kidneys appear within normal limits. No upper abdominal free air or ascites is seen. No adenopathy is appreciated. No dilated bowel loops are seen. A small bowel-transverse colon anastomosis appears clear of nodularity or inflammation. Bladder is moderately distended and normal in appearance. Several fiducials are incidentally noted in the prostate. Bony structures appear to be intact.        1. Moderately extensive mid sigmoid diverticulitis, with minimal extraluminal free air, and questionable early development of small abscess. No free intra-abdominal air or well-defined, drainable abscess are seen. 2. No other evidence of acute intra-abdominal or intrapelvic disease elsewhere.  This report was finalized on 8/29/2022 7:47 PM by Dr. Bismark Yee MD.      XR Chest 1 View    Result Date: 8/29/2022  DATE OF EXAM: 8/29/2022 6:10 PM  PROCEDURE: XR CHEST 1 VW-  INDICATIONS: cough  COMPARISON: No comparisons available.  TECHNIQUE: Single radiographic AP view of the chest was obtained.  FINDINGS: The heart, mediastinum and pulmonary vasculature appear within normal limits. Lungs are moderately well-expanded and appear clear except for appearance of mild peribronchial thickening of the lower lungs, which could be chronic or could represent a bronchitis/viral syndrome. No focal lung disease effusion or pneumothorax is seen.      Peribronchial thickening of the lower lungs, with history of cough, suspicious for viral syndrome/bronchitis.  This report was finalized on 8/29/2022 6:31 PM by Dr. Bismark Yee MD.                    Plan for Follow-up of Pending  Labs/Results:   Pending Labs     Order Current Status    Blood Culture - Blood, Arm, Right Preliminary result    Blood Culture - Blood, Arm, Right Preliminary result        Discharge Details        Discharge Medications      New Medications      Instructions Start Date   ertapenem 1 gm/100ml solution IV  Commonly known as: INVanz   1 g, Intravenous, Daily   Start Date: September 2, 2022     guaiFENesin-codeine 100-10 MG/5ML liquid  Commonly known as: GUAIFENESIN AC   5 mL, Oral, Every 4 Hours PRN         Continue These Medications      Instructions Start Date   atorvastatin 40 MG tablet  Commonly known as: LIPITOR   No dose, route, or frequency recorded.      coenzyme Q10 100 MG capsule   100 mg, Oral, Daily      hydrOXYzine 25 MG tablet  Commonly known as: ATARAX   50 mg, Oral      Synthroid 88 MCG tablet  Generic drug: levothyroxine   88 mcg, Oral, Every Morning      tamsulosin 0.4 MG capsule 24 hr capsule  Commonly known as: FLOMAX   0.4 mg, Oral, Nightly             Allergies   Allergen Reactions   • Levaquin [Levofloxacin] Other (See Comments)     Urethritis          Discharge Disposition:      Diet:  Hospital:  Diet Order   Procedures   • Diet Regular; GI Soft       Activity:      Restrictions or Other Recommendations:         CODE STATUS:    Code Status and Medical Interventions:   Ordered at: 08/29/22 2131     Level Of Support Discussed With:    Patient     Code Status (Patient has no pulse and is not breathing):    CPR (Attempt to Resuscitate)     Medical Interventions (Patient has pulse or is breathing):    Full Support       Future Appointments   Date Time Provider Department Center   9/7/2022 10:30 AM Clement Nicole MD [facility] None   9/7/2022 11:00 AM  [facility] RAD ONC SIM  [facility]   9/7/2022 12:00 PM [facility] MRI 3T  [facility]                 Bernarda Guerra MD  09/01/22      Time Spent on Discharge:  I spent  35 minutes on this discharge activity which included: face-to-face encounter  with the patient, reviewing the data in the system, coordination of the care with the nursing staff as well as consultants, documentation, and entering orders.

## 2022-09-01 NOTE — CASE MANAGEMENT/SOCIAL WORK
Case Management Discharge Note      Final Note: Cm consult noted for arrangements to be made for outpatient IV infusion of Invanz 1g daily - called [facility] where patient has infused before and requested for outpatient infusions this time- they would not accept the patient unless there was a prior auth done by me for services at their facility. Left a message with [employer] as well as with Marleny at [facility] who assists with their own prior auths to request further assistance it obtaining this prior auth- no return call was made in over 2 hours. Patient wants to go home and will take his order for IV infusion to the [facility] ER tomorrow for his infusions to begin at that time. He states he has done this before and had no issues. Order for Invanz IV daily x 1 week given to the patient and follow up with Marco A has been made for 9/7 @ 3:30 PM - Patient will drive himself to [place] at discharge and then will meet his son who will drive him the rest of the way home. - jeni [phone]         Selected Continued Care - Admitted Since 8/29/2022     Destination    No services have been selected for the patient.              Durable Medical Equipment    No services have been selected for the patient.              Dialysis/Infusion    No services have been selected for the patient.              Home Medical Care    No services have been selected for the patient.              Therapy    No services have been selected for the patient.              Community Resources    No services have been selected for the patient.              Community & DME    No services have been selected for the patient.                       Final Discharge Disposition Code: 01 - home or self-care

## 2022-09-01 NOTE — PROGRESS NOTES
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 Coli with Augmentin resistance by his report. history coexisting prostate cancer with treatment decisions ongoing; admitted August 29 with acute onset right lower quadrant abdominal pain.  Associated with subjective fever/sweats per notes and low-grade nonproductive cough. Abdominal CT with moderately extensive mid sigmoid diverticulitis with extraluminal air and possible early development of small abscess per radiology; no drainable abscess though. Chest x-ray with concern for bronchitis by radiology although no focal lung disease/effusion by their report.     9/1/22 Abdominal pain improved since admission.  advanced diet to soft yesterday.  No hematochezia melena or hematemesis.  No diarrhea at present.  Denies current vomiting.  No air in his urine and no fecaluria     No headache photophobia or neck stiffness. On room air with no cough at present.  Denies dyspnea.  No dysuria hematuria or pyuria.  No other new skin rash       ROS:      9/1/22 No f/c/s. No n/v/d. No rash. No new ADR to Abx.     PE:   /96 (BP Location: Right arm, Patient Position: Sitting)   Pulse 82   Temp 97.9 °F (36.6 °C) (Oral)   Resp 16   Ht 182.9 cm (72\")   Wt 84.4 kg " (186 lb)   SpO2 96%   BMI 25.23 kg/m²       GENERAL: Awake and alert, in no acute distress.   HEENT: Normocephalic, atraumatic.  PERRL. EOMI. No conjunctival injection. No icterus. Oropharynx clear without evidence of thrush or exudate. No evidence of peridontal disease.    NECK: Supple without nuchal rigidity. No mass.  LYMPH: No cervical, axillary or inguinal lymphadenopathy.  HEART: RRR; No murmur, rubs, gallops.   LUNGS: Clear to auscultation bilaterally without wheezing, rales, rhonchi. Normal respiratory effort. Nonlabored. No dullness.  ABDOMEN: Soft,less tender, nondistended. Positive bowel sounds. No rebound or guarding. NO mass or HSM.  EXT:  No cyanosis, clubbing or edema. No cord.   MSK: FROM without joint effusions noted arms/legs.    SKIN: Warm and dry without cutaneous eruptions on Inspection/palpation.    NEURO: Oriented to PPT. No focal deficits on motor/sensory exam at arms/legs.  PSYCHIATRIC: Normal insight and judgement. Cooperative with PE     No peripheral stigmata such phenomenon of endocarditis     IV without obvious redness or drainage         Laboratory Data    Results from last 7 days   Lab Units 09/01/22  0705 08/31/22  1343 08/30/22  0917   WBC 10*3/mm3 8.35 8.75 13.31*   HEMOGLOBIN g/dL 12.4* 12.5* 12.8*   HEMATOCRIT % 37.2* 38.5 38.7   PLATELETS 10*3/mm3 266 274 267     Results from last 7 days   Lab Units 09/01/22  0705   SODIUM mmol/L 137   POTASSIUM mmol/L 4.1   CHLORIDE mmol/L 102   CO2 mmol/L 27.0   BUN mg/dL 10   CREATININE mg/dL 1.05   GLUCOSE mg/dL 98   CALCIUM mg/dL 8.4*     Results from last 7 days   Lab Units 08/31/22  1343   ALK PHOS U/L 94   BILIRUBIN mg/dL 0.5   ALT (SGPT) U/L 15   AST (SGOT) U/L 17               Estimated Creatinine Clearance: 73.7 mL/min (by C-G formula based on SCr of 1.05 mg/dL).      Microbiology:      Radiology:  Imaging Results (Last 72 Hours)     Procedure Component Value Units Date/Time    CT Abdomen Pelvis With Contrast [[id number]] Collected:  08/29/22 1942     Updated: 08/29/22 1950    Narrative:      DATE OF EXAM: 8/29/2022 6:43 PM     PROCEDURE: CT ABDOMEN PELVIS W CONTRAST-     INDICATIONS: RLQ pain     COMPARISON: No comparisons available.     TECHNIQUE: Routine transaxial slices were obtained through the abdomen  and pelvis after the intravenous administration of 100 mL of Isovue 300.  Reconstructed coronal and sagittal images were also obtained. Automated  exposure control and iterative construction methods were used.     The radiation dose reduction device was turned on for each scan per the  ALARA (As Low as Reasonably Achievable) protocol.     FINDINGS:  There is an approximately 8 cm segment of thickened/inflamed mid  sigmoid, with what appear to be a couple of bubbles of extraluminal air.  There may be a very small area of developing abscess 15 mm in diameter  on axial image 111 series 3, but no evidence of a well-defined,  drainable abscess is seen. No free air is identified. There is a trace  amount of intrapelvic free fluid.     Elsewhere, the included lower lungs appear clear. There is  postcholecystectomy dilatation of the common bile duct and intrahepatic  ducts. Liver otherwise appears unremarkable. Spleen is not enlarged.  Pancreas adrenal glands, and kidneys appear within normal limits. No  upper abdominal free air or ascites is seen. No adenopathy is  appreciated. No dilated bowel loops are seen. A small bowel-transverse  colon anastomosis appears clear of nodularity or inflammation. Bladder  is moderately distended and normal in appearance. Several fiducials are  incidentally noted in the prostate. Bony structures appear to be intact.             Impression:      1. Moderately extensive mid sigmoid diverticulitis, with minimal  extraluminal free air, and questionable early development of small  abscess. No free intra-abdominal air or well-defined, drainable abscess  are seen.  2. No other evidence of acute intra-abdominal or  intrapelvic disease  elsewhere.     This report was finalized on 8/29/2022 7:47 PM by Dr. Bismark Yee MD.       XR Chest 1 View [[id number]] Collected: 08/29/22 1829     Updated: 08/29/22 1834    Narrative:      DATE OF EXAM: 8/29/2022 6:10 PM     PROCEDURE: XR CHEST 1 VW-     INDICATIONS: cough     COMPARISON: No comparisons available.     TECHNIQUE: Single radiographic AP view of the chest was obtained.     FINDINGS:  The heart, mediastinum and pulmonary vasculature appear within normal  limits. Lungs are moderately well-expanded and appear clear except for  appearance of mild peribronchial thickening of the lower lungs, which  could be chronic or could represent a bronchitis/viral syndrome. No  focal lung disease effusion or pneumothorax is seen.        Impression:      Peribronchial thickening of the lower lungs, with history of cough,  suspicious for viral syndrome/bronchitis.     This report was finalized on 8/29/2022 6:31 PM by Dr. Bismark Yee MD.               Impression:       --acute abdominal pain, acute sigmoid diverticulitis complicated by extraluminal free air and possible early development of small abscess by radiology on CT scan August 29.  White blood cell count increased a bit August 30 but clinically he is improving otherwise and trended down after.  Empiric Invanz ongoing.  Discussed with Dr. Arrington.  He is following to help guide further timing/option of threshold for surgical intervention/endoscopy at his discretion.  No prior history C. Difficile or other specific exposure.  No diarrhea at present.     --acute dry cough at presentation with concern for possible bronchitis at admission.  On room air with improved symptoms. PCR panel negative.  No specific focal findings on exam.  Monitor for other evolution     --History recurrent UTIs/prostatitis 2022.  Currently no new urinary symptoms     --history of prostate cancer     PLAN:       --IV Invanz     --Check/review labs cultures and  scans     --Partial history per nursing staff     --Discussed with microbiology     --Discussed with Dr. Arrington and with Dr. Guerra     --highly complex set of issues with high risk for further serious morbidity and other serious sequela     --We will continue to cover while Dr. Purvis out    **I anticipate IV Invanz daily at his local facility.  Probable repeat CT scan to ensure improvements at 1-2 weeks.  Follow-up with us next week           Catracho Strickland MD  9/1/2022

## 2022-09-01 NOTE — PLAN OF CARE
Problem: Adult Inpatient Plan of Care  Goal: Absence of Hospital-Acquired Illness or Injury  Intervention: Identify and Manage Fall Risk  Description: Perform standard risk assessment on admission using a validated tool or comprehensive approach appropriate to the patient; reassess fall risk frequently, with change in status or transfer to another level of care.  Communicate fall injury risk to interprofessional healthcare team.  Determine need for increased observation, equipment and environmental modification, such as low bed, signage and supportive, nonskid footwear.  Adjust safety measures to individual developmental age, stage and identified risk factors.  Reinforce the importance of safety and physical activity with patient and family.  Perform regular intentional rounding to assess need for position change, pain assessment and personal needs, including assistance with toileting.  Recent Flowsheet Documentation  Taken 9/1/2022 0000 by Jet Otero, RN  Safety Promotion/Fall Prevention:   activity supervised   clutter free environment maintained   assistive device/personal items within reach   elopement precautions   fall prevention program maintained   nonskid shoes/slippers when out of bed   safety round/check completed  Taken 8/31/2022 2200 by Jet Otero, RN  Safety Promotion/Fall Prevention:   activity supervised   assistive device/personal items within reach   clutter free environment maintained   elopement precautions   fall prevention program maintained   nonskid shoes/slippers when out of bed   safety round/check completed  Taken 8/31/2022 2000 by Jet Otero, RN  Safety Promotion/Fall Prevention:   activity supervised   assistive device/personal items within reach   clutter free environment maintained   elopement precautions   fall prevention program maintained   nonskid shoes/slippers when out of bed   safety round/check completed  Intervention: Prevent Skin Injury  Description: Perform a screening  for skin injury risk, such as pressure or moisture associated skin damage on admission and at regular intervals throughout hospital stay.  Keep all areas of skin (especially folds) clean and dry.  Maintain adequate skin hydration.  Relieve and redistribute pressure and protect bony prominences; implement measures based on patient-specific risk factors.  Match turning and repositioning schedule to clinical condition.  Encourage weight shift frequently; assist with reposition if unable to complete independently.  Float heels off bed; avoid pressure on the Achilles tendon.  Keep skin free from extended contact with medical devices.  Encourage functional activity and mobility, as early as tolerated.  Use aids (e.g., slide boards, mechanical lift) during transfer.  Recent Flowsheet Documentation  Taken 9/1/2022 0000 by Jet Otero RN  Body Position: position changed independently  Taken 8/31/2022 2200 by Jet Otero RN  Body Position: position changed independently  Taken 8/31/2022 2000 by Jet Otero RN  Body Position: position changed independently  Skin Protection:   adhesive use limited   incontinence pads utilized   protective footwear used   skin-to-skin areas padded   tubing/devices free from skin contact  Intervention: Prevent and Manage VTE (Venous Thromboembolism) Risk  Description: Assess for VTE (venous thromboembolism) risk.  Encourage and assist with early ambulation.  Initiate and maintain compression or other therapy, as indicated, based on identified risk in accordance with organizational protocol and provider order.  Encourage both active and passive leg exercises while in bed, if unable to ambulate.  Recent Flowsheet Documentation  Taken 9/1/2022 0000 by Jet Otero RN  Activity Management:   activity adjusted per tolerance   up ad kelsea  Taken 8/31/2022 2200 by Jet Otero RN  Activity Management:   activity adjusted per tolerance   up ad kelsea  Taken 8/31/2022 2000 by Jet Otero RN  Activity  Management:   activity adjusted per tolerance   up ad kelsea   dorsiflexion/plantar flexion performed  Range of Motion: active ROM (range of motion) encouraged  Intervention: Prevent Infection  Description: Maintain skin and mucous membrane integrity; promote hand, oral and pulmonary hygiene.  Optimize fluid balance, nutrition, sleep and glycemic control to maximize infection resistance.  Identify potential sources of infection early to prevent or mitigate progression of infection (e.g., wound, lines, devices).  Evaluate ongoing need for invasive devices; remove promptly when no longer indicated.  Recent Flowsheet Documentation  Taken 8/31/2022 2000 by Jet Otero RN  Infection Prevention:   environmental surveillance performed   hand hygiene promoted   rest/sleep promoted   single patient room provided  Goal: Optimal Comfort and Wellbeing  Intervention: Provide Person-Centered Care  Description: Use a family-focused approach to care.  Develop trust and rapport by proactively providing information, encouraging questions, addressing concerns and offering reassurance.  Acknowledge emotional response to hospitalization.  Recognize and utilize personal coping strategies.  Honor spiritual and cultural preferences.  Recent Flowsheet Documentation  Taken 8/31/2022 2000 by Jet Otero RN  Trust Relationship/Rapport:   care explained   choices provided   emotional support provided   empathic listening provided   questions answered   questions encouraged   reassurance provided   thoughts/feelings acknowledged     Problem: Pain Acute  Goal: Acceptable Pain Control and Functional Ability  Outcome: Ongoing, Progressing  Intervention: Prevent or Manage Pain  Description: Evaluate pain level, effect of treatment and patient response at regular intervals.  Minimize painful stimuli; coordinate care and adjust environment (e.g., light, noise, unnecessary movement); promote sleep/rest.  Match pharmacologic analgesia to severity and  type of pain mechanism (e.g., neuropathic, muscle, inflammatory); consider multimodal approach (e.g., nonopioid, opioid, adjuvant).  Provide medication at regular intervals; titrate to patient response; premedicate for painful procedures.  Manage breakthrough pain with additional doses; consider rotation or switching medication.  Monitor for signs of substance tolerance (increased dose to reach desired effect, decreased effect with same dose).  Manage medication-induced effects, such as constipation, nausea, pruritus, urinary retention, somnolence and dizziness.  Provide multimodal interventions, such as as physical activity, therapeutic exercise, yoga, TENS (transcutaneous electrical nerve stimulation) and manual therapy.  Train in functional activity modifications, such as body mechanics, posture, ergonomics, energy conservation and activity pacing.  Consider addition of complementary or alternative therapy, such as acupuncture, hypnosis or therapeutic touch.  Flowsheets  Taken 8/31/2022 2000 by Jet Otero RN  Sensory Stimulation Regulation:   care clustered   quiet environment promoted  Medication Review/Management: medications reviewed  Taken 8/31/2022 0800 by Saima Narayanan RN  Bowel Elimination Promotion:   adequate fluid intake promoted   ambulation promoted  Taken 8/31/2022 0043 by Jet Otero RN  Sleep/Rest Enhancement:   relaxation techniques promoted   regular sleep/rest pattern promoted  Intervention: Develop Pain Management Plan  Description: Acknowledge patient as the expert in pain self-management.  Use a consistent, validated tool for pain assessment; include function and quality of life.  Evaluate risk for opioid use and dependence.  Set pain management goals; determine acceptable level of discomfort to allow for maximal functioning.  Determine [id number] pain management plan, including both pharmacologic and nonpharmacologic measures; integrate management of chronic (persistent)  pain.  Identify and integrate past successful treatment measures, if able.  Encourage patient and caregiver involvement in pain assessment, interventions and safety measures.  Re-evaluate plan regularly.  Flowsheets (Taken 8/31/2022 0043)  Pain Management Interventions:   care clustered   quiet environment facilitated   relaxation techniques promoted  Intervention: Optimize Psychosocial Wellbeing  Description: Facilitate patient’s self-control over pain by providing pain information and allowing choices in treatment.  Consider and address emotional response to pain.  Explore and promote use of coping strategies; address barriers to successful coping.  Evaluate and assist with psychosocial, cultural and spiritual factors impacting pain.  Modify pain perception using techniques, such as distraction, mindfulness, guided imagery, meditation or music.  Assess for risk factors for developing chronic pain, such as depression, fear, pain avoidance and pain catastrophizing.  Consider referral for ongoing coping support, such as education, relaxation training and role of thoughts.  Flowsheets (Taken 8/31/2022 2000)  Supportive Measures:   active listening utilized   positive reinforcement provided   problem-solving facilitated   relaxation techniques promoted   self-care encouraged   self-reflection promoted   self-responsibility promoted   verbalization of feelings encouraged  Diversional Activities:   television   smartphone   Goal Outcome Evaluation:

## 2022-09-01 NOTE — PLAN OF CARE
Problem: Adult Inpatient Plan of Care  Goal: Plan of Care Review  Outcome: Met  Goal: Patient-Specific Goal (Individualized)  Outcome: Met  Goal: Absence of Hospital-Acquired Illness or Injury  Outcome: Met  Intervention: Identify and Manage Fall Risk  Recent Flowsheet Documentation  Taken 9/1/2022 0800 by Varsha Zhu RN  Safety Promotion/Fall Prevention:   activity supervised   assistive device/personal items within reach   clutter free environment maintained   fall prevention program maintained   nonskid shoes/slippers when out of bed   room organization consistent   safety round/check completed  Intervention: Prevent Skin Injury  Recent Flowsheet Documentation  Taken 9/1/2022 0800 by Varsha Zhu RN  Body Position: position changed independently  Intervention: Prevent and Manage VTE (Venous Thromboembolism) Risk  Recent Flowsheet Documentation  Taken 9/1/2022 0800 by Varsha Zhu RN  Activity Management: activity adjusted per tolerance  VTE Prevention/Management:   bilateral   sequential compression devices off  Goal: Optimal Comfort and Wellbeing  Outcome: Met  Intervention: Provide Person-Centered Care  Recent Flowsheet Documentation  Taken 9/1/2022 0800 by Varsha Zhu RN  Trust Relationship/Rapport:   care explained   choices provided  Goal: Readiness for Transition of Care  Outcome: Met     Problem: Pain Acute  Goal: Acceptable Pain Control and Functional Ability  Outcome: Met  Intervention: Optimize Psychosocial Wellbeing  Recent Flowsheet Documentation  Taken 9/1/2022 0800 by Varsha Zhu RN  Diversional Activities: television   Goal Outcome Evaluation:

## 2022-09-02 ENCOUNTER — TELEPHONE (OUTPATIENT)
Dept: RADIATION ONCOLOGY | Facility: HOSPITAL | Age: 72
End: 2022-09-02

## 2022-09-02 NOTE — TELEPHONE ENCOUNTER
Called and spoke with pt regarding recent hospitalization with diverticulitis.  Explained Dr. Nicole does not want to proceed with CTsimulation and MRI until pt is off IV antibiotics and we have the result from his repeat CT scan in 1-2 weeks.  Pt verbalized understanding.  Pt reports recent PSA from Dr. Durham's office of 4.9.  Pt will contact us when CT scan has been done.

## 2022-09-02 NOTE — CASE MANAGEMENT/SOCIAL WORK
Continued Stay Note  [facility]     Patient Name: Talon Anguiano  MRN: [id number]  Today's Date: 2022    Admit Date: 2022     Discharge Plan     Row Name 22 0916       Plan    Plan CM update    Plan Comments Patient chose to discharge home stating he would go to [facility] [facility] for his outpatient Invanz due to long wait time on insurance auth that [place] [facility] was requesting. Spoke with Marleny at [facility] shortly after patient discharged, she advised further on the insurance auth- after spending 2 hours trying to reach the prior auth department for outpatient infusions at [employer], a rep was finally trying to assist but she was unable to find the patient in their system using his ID number as well as name and . Spoke with staff at [facility] infusion center this morning and notified them as well of the patients plan to go to the ED for his infusion- she advised their center was closed until Tuesday so he would have had to go there anyway for the infusions but offered to try to get the prior auth herself. Patient's ID card, order, and facesheet was faxed to her at [phone]. Either way patient will report to [facility] ED for his Invanz infusion, said he had done this before and had knowledge on how to proceed - jeni [phone]               Discharge Codes    No documentation.               Expected Discharge Date and Time     Expected Discharge Date Expected Discharge Time    Sep 1, 2022             Jeni Porter RN

## 2022-09-02 NOTE — OUTREACH NOTE
Prep Survey    Flowsheet Row Responses   [facility] facility patient discharged from? [facility]   Is LACE score < 7 ? No   Emergency Room discharge w/ pulse ox? No   Eligibility Readm Mgmt   Discharge diagnosis acute diverticulitis,   Does the patient have one of the following disease processes/diagnoses(primary or secondary)? Other   Does the patient have Home health ordered? No   Is there a DME ordered? No   Prep survey completed? Yes          DONNIE YANES - Registered Nurse

## 2022-09-03 LAB
BACTERIA SPEC AEROBE CULT: NORMAL
BACTERIA SPEC AEROBE CULT: NORMAL

## 2022-09-07 ENCOUNTER — APPOINTMENT (OUTPATIENT)
Dept: MRI IMAGING | Facility: HOSPITAL | Age: 72
End: 2022-09-07

## 2022-09-07 ENCOUNTER — HOSPITAL ENCOUNTER (OUTPATIENT)
Dept: RADIATION ONCOLOGY | Facility: HOSPITAL | Age: 72
Setting detail: RADIATION/ONCOLOGY SERIES
Discharge: HOME | End: 2022-09-07

## 2022-09-12 ENCOUNTER — READMISSION MANAGEMENT (OUTPATIENT)
Dept: CALL CENTER | Facility: HOSPITAL | Age: 72
End: 2022-09-12

## 2022-09-12 NOTE — OUTREACH NOTE
Medical Week 2 Survey    Flowsheet Row Responses   [facility] patient discharged from? [place]   Does the patient have one of the following disease processes/diagnoses(primary or secondary)? Other   Week 2 attempt successful? Yes   Call start time 1135   Discharge diagnosis acute diverticulitis,   Call end time 1137   Person spoke with today (if not patient) and relationship Kevin-rebecca    Meds reviewed with patient/caregiver? Yes   Is the patient having any side effects they believe may be caused by any medication additions or changes? No   Does the patient have all medications ordered at discharge? Yes   Prescription comments Pt was transitioned to oral ABX   Is the patient taking all medications as directed (includes completed medication regime)? Yes   Comments regarding appointments Appt with ID 9/7/22   Does the patient have a primary care provider?  Yes   Comments regarding PCP Pt and PCP are friends   Has the patient kept scheduled appointments due by today? Yes   Psychosocial issues? No   Did the patient receive a copy of their discharge instructions? --  [unsure]   Nursing interventions Reviewed instructions with patient   What is the patient's perception of their health status since discharge? Improving   Is the patient/caregiver able to teach back signs and symptoms related to disease process for when to call PCP? Yes   Is the patient/caregiver able to teach back signs and symptoms related to disease process for when to call 911? Yes   Is the patient/caregiver able to teach back the hierarchy of who to call/visit for symptoms/problems? PCP, Specialist, Home health nurse, Urgent Care, ED, 911 Yes   If the patient is a current smoker, are they able to teach back resources for cessation? Not a smoker   Week 2 Call Completed? Yes   Revoked No further contact(revokes)-requires comment   Graduated/Revoked comments Kevin reports pt is doing well          LANRE SHORT - Registered Nurse

## 2022-09-14 ENCOUNTER — HOSPITAL ENCOUNTER (OUTPATIENT)
Dept: CT IMAGING | Facility: HOSPITAL | Age: 72
Discharge: HOME | End: 2022-09-14
Admitting: INTERNAL MEDICINE

## 2022-09-14 DIAGNOSIS — K57.20 DIVERTICULITIS OF LARGE INTESTINE WITH ABSCESS, UNSPECIFIED BLEEDING STATUS: ICD-10-CM

## 2022-09-14 PROCEDURE — 74177 CT ABD & PELVIS W/CONTRAST: CPT

## 2022-09-14 PROCEDURE — 25010000002 IOPAMIDOL 61 % SOLUTION: Performed by: INTERNAL MEDICINE

## 2022-09-14 RX ADMIN — IOPAMIDOL 85 ML: 612 INJECTION, SOLUTION INTRAVENOUS at 16:52

## 2022-09-16 ENCOUNTER — TELEPHONE (OUTPATIENT)
Dept: RADIATION ONCOLOGY | Facility: HOSPITAL | Age: 72
End: 2022-09-16

## 2022-09-16 NOTE — TELEPHONE ENCOUNTER
Called and left pt message explaining that he cannot be cleared to begin radiation until he is seen by infectious disease.   Pt did the CT but cancelled his f/u appt with Dr. Solomon.  I explained once he has been seen and cleared we can reschedule his appt for re-eval, ct sim and MRI.

## 2022-09-27 ENCOUNTER — TELEPHONE (OUTPATIENT)
Dept: RADIATION ONCOLOGY | Facility: HOSPITAL | Age: 72
End: 2022-09-27

## 2022-09-27 NOTE — TELEPHONE ENCOUNTER
Pt called wanting to get started.  I explained  wants clearance from infectious disease stating he is cleared to started.    I explained he had missed his f/u with Dr. Solomon. Pt became very argumentative.  He states I did not miss my appointment.  They did not tell me the date and time of the appointment. It was the nurse's fault because she did not tell me. He states Dr. Solomon called him 3 days after the CT scan was complete and told him he the diverticulitis was 90% clear and he could begin treatment.  He said have Dr. Nicole call Dr. Solomon and get me started or I might change my mind.  I explained to the pt it was his choice to be treated or not and that I was doing what I was told by Dr. Nicole. He states he needs to treat me while I'm well and he needs to understand that I'm 72 years old and 74 year olds have health problems.  I tried to explain Dr. Nicole was out today but pt continually talked over me stating he needs to get started.

## 2022-11-03 ENCOUNTER — OFFICE VISIT (OUTPATIENT)
Dept: RADIATION ONCOLOGY | Facility: HOSPITAL | Age: 72
End: 2022-11-03

## 2022-11-03 ENCOUNTER — HOSPITAL ENCOUNTER (OUTPATIENT)
Dept: MRI IMAGING | Facility: HOSPITAL | Age: 72
Discharge: HOME | End: 2022-11-03

## 2022-11-03 ENCOUNTER — HOSPITAL ENCOUNTER (OUTPATIENT)
Dept: RADIATION ONCOLOGY | Facility: HOSPITAL | Age: 72
Discharge: HOME | End: 2022-11-03

## 2022-11-03 ENCOUNTER — HOSPITAL ENCOUNTER (OUTPATIENT)
Dept: RADIATION ONCOLOGY | Facility: HOSPITAL | Age: 72
Setting detail: RADIATION/ONCOLOGY SERIES
Discharge: HOME | End: 2022-11-03

## 2022-11-03 VITALS
HEIGHT: 72 IN | WEIGHT: 184.2 LBS | OXYGEN SATURATION: 96 % | RESPIRATION RATE: 16 BRPM | BODY MASS INDEX: 24.95 KG/M2 | SYSTOLIC BLOOD PRESSURE: 124 MMHG | TEMPERATURE: 97.8 F | HEART RATE: 74 BPM | DIASTOLIC BLOOD PRESSURE: 65 MMHG

## 2022-11-03 DIAGNOSIS — C61 PROSTATE CANCER: Primary | ICD-10-CM

## 2022-11-03 DIAGNOSIS — C61 PROSTATE CANCER: ICD-10-CM

## 2022-11-03 PROCEDURE — G0463 HOSPITAL OUTPT CLINIC VISIT: HCPCS

## 2022-11-03 PROCEDURE — 72195 MRI PELVIS W/O DYE: CPT

## 2022-11-03 NOTE — PROGRESS NOTES
RE-EVALUATION    PATIENT:                                                      Talon Anguiano  :                                                          1950  DATE:                          11/3/2022   DIAGNOSIS:     Prostate cancer (HCC)  Initial- Stage I (cT1c, cN0, cM0, PSA: 7.58, Grade Group: 1)  Current- Stage IIC (cT1c, cN0, cM0, PSA: 9.4, Grade Group: 3)         BRIEF HISTORY:  The patient is a very pleasant 72 y.o. male  with prostate cancer initially diagnosed with very low risk disease 2020.  He chose to undergo definitive treatment with stereotactic body radiotherapy.  Unfortunately, he suffered procedure related E. coli prostatitis and sepsis which required a prolonged course of multiple antibiotics to clear infection.  In the meantime, he most recently suffered a bout of diverticulitis.  He is finally healthy enough to pursue treatment.  Maximum PSA earlier this year was 9.44 ng/mL on 3/22/2022.  He took a 30-day course of bicalutamide which decreased his PSA to 4.96 ng/ml on 2022.  He is currently off androgen suppression.  MRI showed PI-RADS 5 suspicious index lesion in the anterior prostate gland.  He underwent MRI fusion biopsy 2022.  6 out of 6 cores from the target lesion showed Morro's 4+3=7 (60% pattern 4) involving 80% of submitted tissue.  For random biopsies from each lobe were benign.  There was PIN present on the right and focal chronic inflammation bilaterally.  Patient returns today to initiate treatment planning for SBRT with prostate gland.  He has been following all recommended dietary restrictions and prep.    Allergies   Allergen Reactions   • Levaquin [Levofloxacin] Other (See Comments)     Urethritis        Review of Systems   All other systems reviewed and are negative.           IPSS Questionnaire (AUA-7):  Over the past month…    1)  Incomplete Emptying  How often have you had a sensation of not emptying your bladder?  0 - Not at all   2)   "Frequency  How often have you had to urinate less than every two hours? 0 - Not at all   3)  Intermittency  How often have you found you stopped and started again several times when you urinated?  0 - Not at all   4) Urgency  How often have you found it difficult to postpone urination?  1 - Less than 1 time in 5   5) Weak Stream  How often have you had a weak urinary stream?  0 - Not at all   6) Straining  How often have you had to push or strain to begin urination?  0 - Not at all   7) Nocturia  How many times did you typically get up at night to urinate?  2 - 2 times   Total Score:  3       Quality of life due to urinary symptoms:  If you were to spend the rest of your life with your urinary condition the way it is now, how would you feel about that? 1-Pleased   Urine Leakage (Incontinence) 0-No Leakage     Sexual Health Inventory  Current Status    1)  How do you rate your confidence that you could achieve and keep an erection? 4-High   2) When you had erections with sexual stimulation, how often were your erections hard enough for penetration (entering your partner)? 5-Almost always or always   3)  During sexual intercourse, how often were you able to maintain your erection after you had penetrated (entered) into your partner? 5-Almost always or always   4) During sexual intercourse, how difficult was it to maintain your erection to completion of intercourse? 5-Not difficult   5) When you attempted sexual intercourse, how often was it satisfactory to you? 5-Almost always or always   Total Score: 24       Bowel Health Inventory  Current Status: 0-No problems, no rectal bleeding, no discharge, less then 5 bowel movements a day           Objective   VITAL SIGNS:   Vitals:    11/03/22 1259   BP: 124/65   Pulse: 74   Resp: 16   Temp: 97.8 °F (36.6 °C)   SpO2: 96%  Comment: RA   Weight: 83.6 kg (184 lb 3.2 oz)   Height: 182.9 cm (72\")   PainSc: 0-No pain        Karnofsky score: 90   KSP %:  90    Physical Exam  Vitals " and nursing note reviewed.   Constitutional:       Appearance: He is well-developed.   HENT:      Head: Normocephalic and atraumatic.   Cardiovascular:      Rate and Rhythm: Normal rate and regular rhythm.      Heart sounds: Normal heart sounds. No murmur heard.  Pulmonary:      Effort: Pulmonary effort is normal.      Breath sounds: Normal breath sounds. No wheezing or rales.   Abdominal:      General: Bowel sounds are normal. There is no distension.      Palpations: Abdomen is soft.      Tenderness: There is no abdominal tenderness.   Musculoskeletal:         General: No tenderness. Normal range of motion.      Cervical back: Normal range of motion and neck supple.   Lymphadenopathy:      Cervical: No cervical adenopathy.      Upper Body:      Right upper body: No supraclavicular adenopathy.      Left upper body: No supraclavicular adenopathy.   Skin:     General: Skin is warm and dry.   Neurological:      Mental Status: He is alert and oriented to person, place, and time.      Sensory: No sensory deficit.   Psychiatric:         Behavior: Behavior normal.         Thought Content: Thought content normal.         Judgment: Judgment normal.              The following portions of the patient's history were reviewed and updated as appropriate: allergies, current medications, past family history, past medical history, past social history, past surgical history and problem list.    Diagnoses and all orders for this visit:    Prostate cancer (HCC)      IMPRESSION: Prostate cancer, initially diagnosed with stage I, very low risk disease in June 2020.  He now has intermediate risk disease based on MRI targeted biopsy 5/9/2022 showing Pickett's 4+3=7 clinical stage IIC (T1c, N0, M0), maximum PSA 9.44 ng/ml.  30-day trial of bicalutamide prescribed by his oncologist decreased PSA to 4.96 ng/ml on 8/18/2022.  He is now off and ablation and ready to begin definitive treatment of the prostate cancer using stereotactic body  radiotherapy.  CyberKnife treatment procedures been reviewed in detail consent was obtained again.    RECOMMENDATIONS: He will undergo CT simulation and treatment planning MRI pelvis today.  The prostate gland and proximal seminal vesicles will receive 5 daily fractions of 7 Luque each, delivered on CyberKnife.         Clement Nicole MD     25 minutes spent during this visit, 20 minutes directly with patient.

## 2022-11-04 PROCEDURE — 77399 UNLISTED PX MED RADJ PHYSICS: CPT | Performed by: RADIOLOGY

## 2022-11-11 ENCOUNTER — TELEPHONE (OUTPATIENT)
Dept: RADIATION ONCOLOGY | Facility: HOSPITAL | Age: 72
End: 2022-11-11

## 2022-11-11 NOTE — TELEPHONE ENCOUNTER
Pt called asking when treatment would start.  I explained it typically takes 3 weeks from simulation.    He asked if it would be before Thanksgiving.  I explained probably not. I told he would call him when the plan is ready.  Pt verbalized understanding.

## 2022-11-16 PROCEDURE — 77300 RADIATION THERAPY DOSE PLAN: CPT | Performed by: RADIOLOGY

## 2022-11-16 PROCEDURE — 77301 RADIOTHERAPY DOSE PLAN IMRT: CPT | Performed by: RADIOLOGY

## 2022-11-16 PROCEDURE — 77338 DESIGN MLC DEVICE FOR IMRT: CPT | Performed by: RADIOLOGY

## 2022-11-28 ENCOUNTER — HOSPITAL ENCOUNTER (OUTPATIENT)
Dept: RADIATION ONCOLOGY | Facility: HOSPITAL | Age: 72
Discharge: HOME | End: 2022-11-28

## 2022-11-28 PROCEDURE — 77373 STRTCTC BDY RAD THER TX DLVR: CPT | Performed by: RADIOLOGY

## 2022-11-29 ENCOUNTER — HOSPITAL ENCOUNTER (OUTPATIENT)
Dept: RADIATION ONCOLOGY | Facility: HOSPITAL | Age: 72
Discharge: HOME | End: 2022-11-29

## 2022-11-29 PROCEDURE — 77373 STRTCTC BDY RAD THER TX DLVR: CPT | Performed by: RADIOLOGY

## 2022-11-30 ENCOUNTER — HOSPITAL ENCOUNTER (OUTPATIENT)
Dept: RADIATION ONCOLOGY | Facility: HOSPITAL | Age: 72
Discharge: HOME | End: 2022-11-30

## 2022-11-30 PROCEDURE — 77373 STRTCTC BDY RAD THER TX DLVR: CPT | Performed by: RADIOLOGY

## 2022-12-01 ENCOUNTER — HOSPITAL ENCOUNTER (OUTPATIENT)
Dept: RADIATION ONCOLOGY | Facility: HOSPITAL | Age: 72
Discharge: HOME | End: 2022-12-01

## 2022-12-01 ENCOUNTER — HOSPITAL ENCOUNTER (OUTPATIENT)
Dept: RADIATION ONCOLOGY | Facility: HOSPITAL | Age: 72
Setting detail: RADIATION/ONCOLOGY SERIES
Discharge: HOME | End: 2022-12-01
Payer: MEDICARE

## 2022-12-01 PROCEDURE — 77373 STRTCTC BDY RAD THER TX DLVR: CPT | Performed by: RADIOLOGY

## 2022-12-02 ENCOUNTER — HOSPITAL ENCOUNTER (OUTPATIENT)
Dept: RADIATION ONCOLOGY | Facility: HOSPITAL | Age: 72
Discharge: HOME | End: 2022-12-02

## 2022-12-02 PROCEDURE — 77373 STRTCTC BDY RAD THER TX DLVR: CPT | Performed by: RADIOLOGY

## 2022-12-02 PROCEDURE — 77336 RADIATION PHYSICS CONSULT: CPT | Performed by: RADIOLOGY

## 2022-12-09 RX ORDER — METHYLPREDNISOLONE 4 MG/1
TABLET ORAL
Qty: 21 TABLET | Refills: 0 | Status: SHIPPED | OUTPATIENT
Start: 2022-12-09 | End: 2023-01-17

## 2022-12-09 NOTE — TELEPHONE ENCOUNTER
Pt called with reports of burning, frequency of urination.  Slow stream, difficulty starting urine.    Pt is taking flomax nightly but doesn't want to take AZO because he was taking it the same time he had a reaction to levaquin.  Discussed with Dr. Nicole and called pt back.  Explained Rx sent to pharmacy for medrol dose sherrell.    Pt verbalized understanding.

## 2022-12-19 RX ORDER — TAMSULOSIN HYDROCHLORIDE 0.4 MG/1
1 CAPSULE ORAL NIGHTLY
Qty: 30 CAPSULE | Refills: 11 | Status: SHIPPED | OUTPATIENT
Start: 2022-12-19

## 2023-01-17 ENCOUNTER — HOSPITAL ENCOUNTER (OUTPATIENT)
Dept: RADIATION ONCOLOGY | Facility: HOSPITAL | Age: 73
Setting detail: RADIATION/ONCOLOGY SERIES
Discharge: HOME | End: 2023-01-17
Payer: MEDICARE

## 2023-01-17 ENCOUNTER — OFFICE VISIT (OUTPATIENT)
Dept: RADIATION ONCOLOGY | Facility: HOSPITAL | Age: 73
End: 2023-01-17
Payer: MEDICARE

## 2023-01-17 DIAGNOSIS — C61 PROSTATE CANCER: Primary | ICD-10-CM

## 2023-01-17 NOTE — PROGRESS NOTES
TELEMEDICINE FOLLOW UP NOTE    PATIENT:                                                      Talon Anguiano  MEDICAL RECORD #:                        [id number]  :                                                          1950  COMPLETION DATE:   2022  DIAGNOSIS:     Prostate cancer (HCC)  - Initial: Stage I (cT1c, cN0, cM0, PSA: 9.3, Grade Group: 1)  - Current: Stage IIC (cT1c, cN0, cM0, PSA: 9.4, Grade Group: 3)    This visit has been converted to a telehealth virtual visit.  Total time of discussion was 31 minutes.  The patient has given verbal consent.    COVID-19 RISK SCREEN     1. Has the patient had close contact or exposure without PPE with a lab confirmed COVID-19 (+) person or a person under investigation (PUI) for COVID-19 infection?  -- No     2. Has the patient had respiratory symptoms, worsened/new cough and/or SOA, unexplained fever, or sudden loss of smell and/or taste in the past week? --  No    3. Has the patient completed COVID vaccination? --  Yes       BRIEF HISTORY:    Initial follow-up visit.  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  Due to significant delay related to persistent, recurrent E. coli prostatitis requiring multiple rounds of antibiotics and patient seeking second opinion elsewhere, he was found to have stage migration to intermediate risk disease based on MRI targeted biopsy 2022.  Nuclear medicine bone scan 2022 showed no evidence of osseous metastatic disease.  Repeat CT scan of the abdomen and pelvis shows no evidence of concerning intra-abdominal or intrapelvic metastatic disease.  He received a 30-day course of bicalutamide prescribed by [facility], which decreased his PSA to 4.96 ng/ml on 2022.  He is now off androgen ablation.  He underwent definitive treatment with a course of CyberKnife SBRT delivered to the prostate gland.  He tolerated treatment well.  Following treatment, he experienced a 1 week  duration of urinary burning, increased frequency, and hesitancy starting stream.  Symptoms were managed with continuation of Flomax and addition of Medrol Dosepak.  He reports resolution of aforementioned symptoms with completion of steroids.  Presently, he denies acute urinary complaints.  He denies hematuria, dysuria, or incontinence.  He denies fever, chills, or flank pain.  He denies change in bowel function.  He endorses fiber rich diet.  He did not develop exacerbation of fatigue.  He denies additional acute complaints today.  Overall, he feels well.      IPSS Questionnaire (AUA-7):  Over the past month…    1)  Incomplete Emptying  How often have you had a sensation of not emptying your bladder?  0 - Not at all   2)  Frequency  How often have you had to urinate less than every two hours? 0 - Not at all   3)  Intermittency  How often have you found you stopped and started again several times when you urinated?  0 - Not at all   4) Urgency  How often have you found it difficult to postpone urination?  1 - Less than 1 time in 5   5) Weak Stream  How often have you had a weak urinary stream?  0 - Not at all   6) Straining  How often have you had to push or strain to begin urination?  0 - Not at all   7) Nocturia  How many times did you typically get up at night to urinate?  1 - 1 time   Total Score:  2       Quality of life due to urinary symptoms:  If you were to spend the rest of your life with your urinary condition the way it is now, how would you feel about that? 1-Pleased   Urine Leakage (Incontinence) 0-No Leakage     Sexual Health Inventory  Current Status    1)  How do you rate your confidence that you could achieve and keep an erection? 4-High   2) When you had erections with sexual stimulation, how often were your erections hard enough for penetration (entering your partner)? 5-Almost always or always   3)  During sexual intercourse, how often were you able to maintain your erection after you had  penetrated (entered) into your partner? 5-Almost always or always   4) During sexual intercourse, how difficult was it to maintain your erection to completion of intercourse? 5-Not difficult   5) When you attempted sexual intercourse, how often was it satisfactory to you? 5-Almost always or always   Total Score: 24       Bowel Health Inventory  Current Status: 0-No problems, no rectal bleeding, no discharge, less then 5 bowel movements a day           MEDICATIONS: Medication reconciliation for the patient was reviewed and confirmed in the electronic medical record.    Review of Systems   All other systems reviewed and are negative.          KPS 90%      Physical Exam  Pulmonary:      Respirations even, unlabored. No audible wheezing or cough.  Neurological:      A+Ox4, conversant, answers questions appropriately.  Psychiatric:     Judgement, affect, and decision-making WNL.    Limited physical exam as visit was conducted remotely via telephone in light of the COVID-19 pandemic.          The following portions of the patient's history were reviewed and updated as appropriate: allergies, current medications, past family history, past medical history, past social history, past surgical history and problem list.         Diagnoses and all orders for this visit:    1. Prostate cancer (HCC) (Primary)         IMPRESSION: Prostate cancer, initially [place] 3+3 = 6, clinical stage I (T1c, N0, M0), diagnosed 6/9/2021 when his PSA was 7.58 ng/ml.    He had multiple prostate infections with E. coli bacteria requiring ID consultation and several courses of antibiotics.  During that time, repeat pelvic MRI showed PI-RADS 5 suspicious index lesion in the anterior prostate gland, prompting MRI fusion biopsy performed 5/9/2022.    Ultimately, the patient has more recent diagnosis of [place] 3+4 = 7 disease, clinical stage IIC (T1c, N0, M0), maximum PSA 9.44 ng/ml.  Following 30-day trial of bicalutamide, PSA subsequently decreased to  4.96 ng/ml.  The patient is no longer receiving androgen ablation.  6 weeks status post CyberKnife SBRT as definitive treatment.  He tolerated treatment well.  Acute urinary irritative/outflow obstructive symptoms have appropriately resolved.  We discussed plan to taper/discontinue Flomax, as tolerated.    Dr. Anguiano and I have reviewed the survivorship care plan in detail.  We discussed diagnosis, follow-up intervals, biannual PSA monitoring, and expectations for response to treatment.  A copy of the care plan has been mailed to the patient.  A copy has also been sent to his PCP.      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 Durham, with follow-up and repeat PSA scheduled in 3/2023.  We will schedule a 6-month follow-up visit.      Return in about 6 months (around 7/17/2023) for Office Visit.    DELORES Salazar spent a total of 55 minutes on today's visit, with more than 31 minutes in virtual communication with the patient via telephone, and the remainder of the time spent in reviewing the relevant history, records, available imaging, and for documentation.

## 2023-03-14 ENCOUNTER — TELEPHONE (OUTPATIENT)
Dept: RADIATION ONCOLOGY | Facility: HOSPITAL | Age: 73
End: 2023-03-14
Payer: MEDICARE

## 2023-03-14 NOTE — TELEPHONE ENCOUNTER
Pt called with c/o burning with urination and states urine is cloudy since last pm.  Went to PCP and got a UA and states he has a UTI.  He wants Dr. Nicole's opinion on what to do.  Discussed with Dr. Nicole and called pt back and explained he wants him to see Dr. Alcides Purvis with ID.  Pt verbalized understanding.

## 2023-03-16 ENCOUNTER — TELEPHONE (OUTPATIENT)
Dept: RADIATION ONCOLOGY | Facility: HOSPITAL | Age: 73
End: 2023-03-16
Payer: MEDICARE

## 2023-03-16 NOTE — TELEPHONE ENCOUNTER
Pt called stating he had heard nothing from Dr. Nicole or Dr. Cadena.  I called him back and explained I left him a voicemail message that Dr. Nicole wants him to see Dr. Purvis and I left the number to call.  Pt states he doesn't listen to voicemails. I explained there was no way for me to know that.  I provided 's number again. Pt states he still want to speak to Dr. Nicole.  I instructed I would let Dr. Nicole know.

## 2023-07-20 ENCOUNTER — HOSPITAL ENCOUNTER (OUTPATIENT)
Dept: RADIATION ONCOLOGY | Facility: HOSPITAL | Age: 73
Setting detail: RADIATION/ONCOLOGY SERIES
Discharge: HOME | End: 2023-07-20
Payer: MEDICARE

## 2023-07-20 ENCOUNTER — OFFICE VISIT (OUTPATIENT)
Dept: RADIATION ONCOLOGY | Facility: HOSPITAL | Age: 73
End: 2023-07-20
Payer: MEDICARE

## 2023-07-20 VITALS
TEMPERATURE: 97.9 F | OXYGEN SATURATION: 96 % | RESPIRATION RATE: 20 BRPM | BODY MASS INDEX: 24.2 KG/M2 | WEIGHT: 178.4 LBS | HEART RATE: 93 BPM | DIASTOLIC BLOOD PRESSURE: 56 MMHG | SYSTOLIC BLOOD PRESSURE: 110 MMHG

## 2023-07-20 DIAGNOSIS — C61 PROSTATE CANCER: Primary | ICD-10-CM

## 2023-07-20 PROCEDURE — G0463 HOSPITAL OUTPT CLINIC VISIT: HCPCS

## 2023-07-20 RX ORDER — NITROFURANTOIN 25; 75 MG/1; MG/1
1 CAPSULE ORAL DAILY
COMMUNITY
Start: 2023-07-13 | End: 2023-07-20

## 2023-07-20 RX ORDER — HYDROXYZINE PAMOATE 25 MG/1
CAPSULE ORAL
COMMUNITY

## 2023-07-20 RX ORDER — ONDANSETRON 4 MG/1
TABLET, FILM COATED ORAL
COMMUNITY
Start: 2023-07-14

## 2023-07-20 RX ORDER — ATORVASTATIN CALCIUM 20 MG/1
TABLET, FILM COATED ORAL
COMMUNITY

## 2023-07-20 RX ORDER — LEVOTHYROXINE SODIUM 0.05 MG/1
TABLET ORAL DAILY
COMMUNITY
End: 2023-07-20

## 2023-07-20 RX ORDER — CODEINE PHOSPHATE AND GUAIFENESIN 10; 100 MG/5ML; MG/5ML
SOLUTION ORAL
COMMUNITY
Start: 2023-07-14

## 2023-07-20 RX ORDER — AMOXICILLIN 500 MG/1
1 CAPSULE ORAL 3 TIMES DAILY
COMMUNITY
Start: 2023-07-14

## 2023-07-20 RX ORDER — CEFUROXIME AXETIL 500 MG/1
1 TABLET ORAL EVERY 12 HOURS SCHEDULED
COMMUNITY
Start: 2023-07-13 | End: 2023-07-25

## 2023-07-20 NOTE — PROGRESS NOTES
FOLLOW UP NOTE    PATIENT:                                                      Talon Anguiano  MEDICAL RECORD #:                        [id number]  :                                                          1950  COMPLETION DATE:   2022  DIAGNOSIS:     Prostate cancer  Initial clinical- Stage I (cT1c, cN0, cM0, PSA: 9.3, Grade Group: 1)  Pretreatment clinical- Stage IIC (cT1c, cN0, cM0, PSA: 9.4, Grade Group: 3)      BRIEF HISTORY:    Routine follow-up visit.  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  Due to significant delay related to persistent, recurrent E. coli prostatitis requiring multiple rounds of antibiotics and patient seeking second opinion elsewhere, he was found to have stage migration to intermediate risk disease based on MRI targeted biopsy 2022.  Nuclear medicine bone scan 2022 showed no evidence of osseous metastatic disease.  Repeat CT scan of the abdomen and pelvis showed no evidence of concerning intra-abdominal or intrapelvic metastatic disease.  He received a 30-day course of bicalutamide prescribed by  urology, which decreased his PSA to 4.96 ng/ml on 2022.  He is now off androgen ablation.  He underwent definitive treatment with a course of CyberKnife SBRT delivered to the prostate gland.  He tolerated treatment well.  Following treatment, he experienced a 1 week duration of urinary burning, increased frequency, and hesitancy starting stream.  Symptoms were managed with continuation of Flomax and addition of Medrol Dosepak.  He reports resolution of aforementioned symptoms with completion of steroids.  Currently, he denies acute urinary complaints.  He denies hematuria, dysuria, or incontinence.  He denies fever, chills, or flank pain.  He denies change in bowel function.    Overall energy is improved.  He denies additional acute complaints today.  Overall, he feels well.  PSA was 1.87 ng/ml on 3/9/2023.  PSA result was  requested to be added to last week's blood work from [facility], and that should be available soon.      MEDICATIONS: Medication reconciliation for the patient was reviewed and confirmed in the electronic medical record.    Review of Systems   All other systems reviewed and are negative.          IPSS Questionnaire (AUA-7):  Over the past month…    1)  Incomplete Emptying  How often have you had a sensation of not emptying your bladder?  0 - Not at all   2)  Frequency  How often have you had to urinate less than every two hours? 0 - Not at all   3)  Intermittency  How often have you found you stopped and started again several times when you urinated?  0 - Not at all   4) Urgency  How often have you found it difficult to postpone urination?  0 - Not at all   5) Weak Stream  How often have you had a weak urinary stream?  0 - Not at all   6) Straining  How often have you had to push or strain to begin urination?  0 - Not at all   7) Nocturia  How many times did you typically get up at night to urinate?  1 - 1 time   Total Score:  1       Quality of life due to urinary symptoms:  If you were to spend the rest of your life with your urinary condition the way it is now, how would you feel about that? 0-Delighted   Urine Leakage (Incontinence) 0-No Leakage     Sexual Health Inventory  Current Status    1)  How do you rate your confidence that you could achieve and keep an erection? 4-High   2) When you had erections with sexual stimulation, how often were your erections hard enough for penetration (entering your partner)? 0-No sexual activity   3)  During sexual intercourse, how often were you able to maintain your erection after you had penetrated (entered) into your partner? 0-Did not attempt intercourse   4) During sexual intercourse, how difficult was it to maintain your erection to completion of intercourse? 0-Did not attempt intercourse   5) When you attempted sexual intercourse, how often was it satisfactory  to you? 0-No sexual activity   Total Score: 4       Bowel Health Inventory  Current Status: 0-No problems, no rectal bleeding, no discharge, less then 5 bowel movements a day          Physical Exam  Vitals and nursing note reviewed.   Constitutional:       Appearance: He is well-developed.   HENT:      Head: Normocephalic and atraumatic.   Cardiovascular:      Rate and Rhythm: Normal rate and regular rhythm.      Heart sounds: Normal heart sounds. No murmur heard.  Pulmonary:      Effort: Pulmonary effort is normal.      Breath sounds: Normal breath sounds. No wheezing or rales.   Abdominal:      General: Bowel sounds are normal. There is no distension.      Palpations: Abdomen is soft.      Tenderness: There is no abdominal tenderness.   Genitourinary:     Prostate: Not enlarged (small, smooth, no induration or nodules.  Non-tender, Not boggy.), not tender and no nodules present.      Rectum: No mass, tenderness, anal fissure, external hemorrhoid or internal hemorrhoid. Normal anal tone.   Musculoskeletal:         General: No tenderness. Normal range of motion.      Cervical back: Normal range of motion and neck supple.   Lymphadenopathy:      Cervical: No cervical adenopathy.      Upper Body:      Right upper body: No supraclavicular adenopathy.      Left upper body: No supraclavicular adenopathy.   Skin:     General: Skin is warm and dry.   Neurological:      Mental Status: He is alert and oriented to person, place, and time.      Sensory: No sensory deficit.   Psychiatric:         Behavior: Behavior normal.         Thought Content: Thought content normal.         Judgment: Judgment normal.       VITAL SIGNS:   Vitals:    07/20/23 1109   BP: 110/56   Pulse: 93   Resp: 20   Temp: 97.9 °F (36.6 °C)   TempSrc: Skin   SpO2: 96%   Weight: 80.9 kg (178 lb 6.4 oz)   PainSc: 0-No pain                   KSP %:  90    The following portions of the patient's history were reviewed and updated as appropriate: allergies,  current medications, past family history, past medical history, past social history, past surgical history and problem list.         Diagnoses and all orders for this visit:    1. Prostate cancer (Primary)         IMPRESSION:  Prostate cancer, initially [place] 3+3 = 6, clinical stage I (T1c, N0, M0), diagnosed 6/9/2021 when his PSA was 7.58 ng/ml.    He had multiple prostate infections with E. coli bacteria requiring ID consultation and several courses of antibiotics.  During that time, repeat pelvic MRI showed PI-RADS 5 suspicious index lesion in the anterior prostate gland, prompting MRI fusion biopsy performed 5/9/2022.    Ultimately, the patient has more recent diagnosis of [place] 3+4 = 7 disease, clinical stage IIC (T1c, N0, M0), maximum PSA 9.44 ng/ml.  Following 30-day trial of bicalutamide, PSA subsequently decreased to 4.96 ng/ml.  The patient is no longer receiving androgen ablation.  He is now 7 months status post CyberKnife SBRT as definitive treatment.  He tolerated treatment well.  Acute urinary irritative/outflow obstructive symptoms have appropriately resolved.  He has had an appropriate early clinical response and biochemical PSA reduction.    RECOMMENDATIONS: He will continue urology surveillance with at least biannual PSA testing.  Return for follow-up in approximately 6 months.    I spent a total of 25 minutes on todays visit, with more than 20 minutes in direct face to face communication, and the remainder of the time spent in reviewing the relevant history, records, available imaging, and for documentation.    Return in about 6 months (around 1/20/2024) for Office Visit.    Clement Nicole MD

## 2023-07-25 ENCOUNTER — TELEPHONE (OUTPATIENT)
Dept: RADIATION ONCOLOGY | Facility: HOSPITAL | Age: 73
End: 2023-07-25
Payer: MEDICARE

## 2023-07-25 DIAGNOSIS — C61 PROSTATE CANCER: Primary | ICD-10-CM

## 2023-07-25 NOTE — TELEPHONE ENCOUNTER
Called Dr. Ortiz's office, they do not have a recent PSA on file, order faxed to [facility] lab per Dr. Anguiano's request.

## 2023-08-02 ENCOUNTER — DOCUMENTATION (OUTPATIENT)
Dept: RADIATION ONCOLOGY | Facility: HOSPITAL | Age: 73
End: 2023-08-02
Payer: MEDICARE

## 2023-09-27 ENCOUNTER — TRANSCRIBE ORDERS (OUTPATIENT)
Dept: ADMINISTRATIVE | Facility: HOSPITAL | Age: 73
End: 2023-09-27
Payer: MEDICARE

## 2023-09-27 DIAGNOSIS — K57.32 DIVERTICULITIS OF LARGE INTESTINE WITHOUT PERFORATION OR ABSCESS WITHOUT BLEEDING: Primary | ICD-10-CM

## 2024-01-01 RX ORDER — TAMSULOSIN HYDROCHLORIDE 0.4 MG/1
1 CAPSULE ORAL NIGHTLY
Qty: 30 CAPSULE | Refills: 11 | OUTPATIENT
Start: 2024-01-01

## 2024-01-23 ENCOUNTER — HOSPITAL ENCOUNTER (OUTPATIENT)
Dept: RADIATION ONCOLOGY | Facility: HOSPITAL | Age: 74
Setting detail: RADIATION/ONCOLOGY SERIES
Discharge: HOME | End: 2024-01-23
Payer: MEDICARE

## 2024-02-07 ENCOUNTER — OFFICE VISIT (OUTPATIENT)
Dept: RADIATION ONCOLOGY | Facility: HOSPITAL | Age: 74
End: 2024-02-07
Payer: MEDICARE

## 2024-02-07 ENCOUNTER — HOSPITAL ENCOUNTER (OUTPATIENT)
Dept: RADIATION ONCOLOGY | Facility: HOSPITAL | Age: 74
Setting detail: RADIATION/ONCOLOGY SERIES
End: 2024-02-07
Payer: MEDICARE

## 2024-02-07 VITALS
WEIGHT: 181.5 LBS | DIASTOLIC BLOOD PRESSURE: 62 MMHG | BODY MASS INDEX: 24.62 KG/M2 | RESPIRATION RATE: 20 BRPM | HEART RATE: 79 BPM | SYSTOLIC BLOOD PRESSURE: 134 MMHG | TEMPERATURE: 98.2 F | OXYGEN SATURATION: 97 %

## 2024-02-07 DIAGNOSIS — C61 PROSTATE CANCER: Primary | ICD-10-CM

## 2024-02-07 PROCEDURE — G0463 HOSPITAL OUTPT CLINIC VISIT: HCPCS

## 2024-02-07 RX ORDER — ATORVASTATIN CALCIUM 40 MG/1
1 TABLET, FILM COATED ORAL DAILY
COMMUNITY
Start: 2023-10-30

## 2024-02-07 NOTE — PROGRESS NOTES
FOLLOW UP NOTE    PATIENT:                                                      Talon Anguiano  MEDICAL RECORD #:                        [id number]  :                                                          1950  COMPLETION DATE:   2022  DIAGNOSIS:     Prostate cancer  Initial clinical- Stage I (cT1c, cN0, cM0, PSA: 9.3, Grade Group: 1)  Pretreatment clinical- Stage IIC (cT1c, cN0, cM0, PSA: 9.4, Grade Group: 3)      BRIEF HISTORY:    Routine follow-up visit.  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  Due to significant delay related to persistent, recurrent E. coli prostatitis requiring multiple rounds of antibiotics and patient seeking second opinion elsewhere, he was found to have stage migration to intermediate risk disease based on MRI targeted biopsy 2022.  Nuclear medicine bone scan 2022 showed no evidence of osseous metastatic disease.  Repeat CT scan of the abdomen and pelvis showed no evidence of concerning intra-abdominal or intrapelvic metastatic disease.  He received a 30-day course of bicalutamide prescribed by  urology, which decreased his PSA to 4.96 ng/ml on 2022.  He is now off androgen ablation.  He underwent definitive treatment with a course of CyberKnife SBRT delivered to the prostate gland.  He tolerated treatment well.  Following treatment, he experienced a 1 week duration of urinary burning, increased frequency, and hesitancy starting stream.  Symptoms were managed with continuation of Flomax and addition of Medrol Dosepak.  He reports resolution of aforementioned symptoms with completion of steroids.  Currently, he denies acute urinary complaints.  He denies hematuria, dysuria, or incontinence.  He denies fever, chills, or flank pain.  He denies change in bowel function.    Overall energy is improved.  He denies additional acute complaints today.  Overall, he feels well.  PSA was 1.87 ng/ml on 3/9/2023, 0.4 ng/ml 26  2020.    MEDICATIONS: Medication reconciliation for the patient was reviewed and confirmed in the electronic medical record.    Review of Systems   All other systems reviewed and are negative.    IPSS Questionnaire (AUA-7):  Over the past month…     1)  Incomplete Emptying  How often have you had a sensation of not emptying your bladder?  0 - Not at all   2)  Frequency  How often have you had to urinate less than every two hours? 0 - Not at all   3)  Intermittency  How often have you found you stopped and started again several times when you urinated?  0 - Not at all   4) Urgency  How often have you found it difficult to postpone urination?  0 - Not at all   5) Weak Stream  How often have you had a weak urinary stream?  0 - Not at all   6) Straining  How often have you had to push or strain to begin urination?  0 - Not at all   7) Nocturia  How many times did you typically get up at night to urinate?  1 - 1 time   Total Score:  1         Quality of life due to urinary symptoms:  If you were to spend the rest of your life with your urinary condition the way it is now, how would you feel about that? 0-Delighted   Urine Leakage (Incontinence) 0-No Leakage      Sexual Health Inventory  Current Status     1)  How do you rate your confidence that you could achieve and keep an erection? 4-High   2) When you had erections with sexual stimulation, how often were your erections hard enough for penetration (entering your partner)? 0-No sexual activity   3)  During sexual intercourse, how often were you able to maintain your erection after you had penetrated (entered) into your partner? 0-Did not attempt intercourse   4) During sexual intercourse, how difficult was it to maintain your erection to completion of intercourse? 0-Did not attempt intercourse   5) When you attempted sexual intercourse, how often was it satisfactory to you? 0-No sexual activity   Total Score: 4         Bowel Health Inventory  Current Status: 0-No  problems, no rectal bleeding, no discharge, less then 5 bowel movements a day      Karnofsky score: 90     Physical Exam  Vitals and nursing note reviewed.   Constitutional:       Appearance: He is well-developed.   HENT:      Head: Normocephalic and atraumatic.   Cardiovascular:      Rate and Rhythm: Normal rate and regular rhythm.      Heart sounds: Normal heart sounds. No murmur heard.  Pulmonary:      Effort: Pulmonary effort is normal.      Breath sounds: Normal breath sounds. No wheezing or rales.   Abdominal:      General: Bowel sounds are normal. There is no distension.      Palpations: Abdomen is soft.      Tenderness: There is no abdominal tenderness.   Genitourinary:     Prostate: Not enlarged (20 cc, smooth, no nodules or induration.  Nontender.), not tender and no nodules present.      Rectum: No mass, tenderness, anal fissure, external hemorrhoid or internal hemorrhoid. Normal anal tone.   Musculoskeletal:         General: No tenderness. Normal range of motion.      Cervical back: Normal range of motion and neck supple.   Lymphadenopathy:      Cervical: No cervical adenopathy.      Upper Body:      Right upper body: No supraclavicular adenopathy.      Left upper body: No supraclavicular adenopathy.   Skin:     General: Skin is warm and dry.   Neurological:      Mental Status: He is alert and oriented to person, place, and time.      Sensory: No sensory deficit.   Psychiatric:         Behavior: Behavior normal.         Thought Content: Thought content normal.         Judgment: Judgment normal.         VITAL SIGNS:   Vitals:    02/07/24 1540   BP: 134/62   Pulse: 79   Resp: 20   Temp: 98.2 °F (36.8 °C)   TempSrc: Skin   SpO2: 97%   Weight: 82.3 kg (181 lb 8 oz)   PainSc: 0-No pain             Karnofsky score: 90     KSP %:  80    The following portions of the patient's history were reviewed and updated as appropriate: allergies, current medications, past family history, past medical history, past social  history, past surgical history and problem list.         Diagnoses and all orders for this visit:    1. Prostate cancer (Primary)         IMPRESSION:  Prostate cancer, initially [place] 3+3 = 6, clinical stage I (T1c, N0, M0), diagnosed 6/9/2021 when his PSA was 7.58 ng/ml.    He had multiple prostate infections with E. coli bacteria requiring ID consultation and several courses of antibiotics.  During that time, repeat pelvic MRI showed PI-RADS 5 suspicious index lesion in the anterior prostate gland, prompting MRI fusion biopsy performed 5/9/2022.    Ultimately, the patient has more recent diagnosis of [place] 3+4 = 7 disease, clinical stage IIC (T1c, N0, M0), maximum PSA 9.44 ng/ml.  Following 30-day trial of bicalutamide, PSA subsequently decreased to 4.96 ng/ml.  The patient is no longer receiving androgen ablation.  He is now more than 1 year status post CyberKnife SBRT as definitive treatment.  He tolerated treatment well.  Acute urinary irritative/outflow obstructive symptoms have appropriately resolved.  He has had an excellent early clinical response and biochemical PSA reduction.  He is already at target donna PSA value of less than 0.5 ng/ml.  Prognosis should be excellent.    RECOMMENDATIONS: Continue biannual PSA testing.  He wishes to have this performed with his primary physician, Dr. Ortiz.  Annual follow-up with me.    I spent a total of 25 minutes on todays visit, with more than 20 minutes in direct face to face communication, and the remainder of the time spent in reviewing the relevant history, records, available imaging, and for documentation.    Return in about 1 year (around 2/7/2025) for Office Visit.    Clement Nicole MD

## 2024-03-30 RX ORDER — TAMSULOSIN HYDROCHLORIDE 0.4 MG/1
1 CAPSULE ORAL NIGHTLY
Qty: 30 CAPSULE | Refills: 12 | Status: SHIPPED | OUTPATIENT
Start: 2024-03-30

## 2024-06-21 RX ORDER — TAMSULOSIN HYDROCHLORIDE 0.4 MG/1
1 CAPSULE ORAL NIGHTLY
Qty: 30 CAPSULE | Refills: 11 | OUTPATIENT
Start: 2024-06-21

## 2024-07-25 DIAGNOSIS — M79.642 HAND PAIN, LEFT: Primary | ICD-10-CM

## 2024-09-04 ENCOUNTER — OFFICE VISIT (OUTPATIENT)
Dept: ORTHOPEDIC SURGERY | Facility: CLINIC | Age: 74
End: 2024-09-04
Payer: MEDICARE

## 2024-09-04 ENCOUNTER — HOSPITAL ENCOUNTER (OUTPATIENT)
Dept: GENERAL RADIOLOGY | Facility: HOSPITAL | Age: 74
Discharge: HOME | End: 2024-09-04
Admitting: ORTHOPAEDIC SURGERY
Payer: MEDICARE

## 2024-09-04 VITALS — HEIGHT: 72 IN | BODY MASS INDEX: 24.58 KG/M2 | WEIGHT: 181.44 LBS

## 2024-09-04 DIAGNOSIS — M79.642 HAND PAIN, LEFT: ICD-10-CM

## 2024-09-04 DIAGNOSIS — M79.645 PAIN OF LEFT THUMB: Primary | ICD-10-CM

## 2024-09-04 DIAGNOSIS — S69.91XA INJURY, HAND, RIGHT, INITIAL ENCOUNTER: ICD-10-CM

## 2024-09-04 DIAGNOSIS — M79.645 PAIN OF LEFT THUMB: ICD-10-CM

## 2024-09-04 DIAGNOSIS — M20.001 DEFORMITY OF RIGHT THUMB JOINT: Primary | ICD-10-CM

## 2024-09-04 DIAGNOSIS — M18.12 PRIMARY OSTEOARTHRITIS OF FIRST CARPOMETACARPAL JOINT OF LEFT HAND: ICD-10-CM

## 2024-09-04 PROCEDURE — 73140 X-RAY EXAM OF FINGER(S): CPT

## 2024-09-04 PROCEDURE — 73130 X-RAY EXAM OF HAND: CPT

## 2024-09-04 NOTE — PROGRESS NOTES
New Patient Visit      Patient: Talon Anguiano  YOB: 1950  Date of Encounter: 09/04/2024        Chief Complaint:   Chief Complaint   Patient presents with    Left Hand - Pain, Initial Evaluation           HPI:   Talon Anguiano, 74 y.o. male, referred by Didier Ortiz MD presents for evaluation of left thumb injury sustained approximately 2 months ago he was pulling on a limb when he fell backwards caught himself with his left hand and felt something tore in his left thumb he has had deformity of his thumb since with ability grasping.  Has had no improvement in his hand.  He had no problems prior to this event.  His past medical history is negative for rheumatoid arthritis        Active Problem List:  Patient Active Problem List   Diagnosis    Intestinal obstruction    Ischemia of small intestine    Prostate cancer    Diverticulitis    Acute diverticulitis    Bronchitis           Past Medical History:  Past Medical History:   Diagnosis Date    Disease of thyroid gland     Diverticulosis     Prostate cancer     Prostatitis     Skin cancer     basal cell and squamous cell    UTI (urinary tract infection)            Past Surgical History:  Past Surgical History:   Procedure Laterality Date    APPENDECTOMY      COLON RESECTION RIGHT      COLON SURGERY      reanastomosis from colon resection    COLONOSCOPY  04/09/2014    CYBERKNIFE  12/02/2022    Prostate/SV    PROSTATE BIOPSY      PROSTATE FIDUCIAL MARKER PLACEMENT  10/29/2021           Family History:  Family History   Problem Relation Age of Onset    Lung cancer Brother     Lung cancer Brother          Social History:  Social History     Socioeconomic History    Marital status: Significant Other   Tobacco Use    Smoking status: Never    Smokeless tobacco: Never   Vaping Use    Vaping status: Never Used   Substance and Sexual Activity    Alcohol use: Yes    Drug use: No    Sexual activity: Defer     Body mass index is 24.6  kg/m².      Medications:  Current Outpatient Medications   Medication Sig Dispense Refill    atorvastatin (LIPITOR) 40 MG tablet Take 1 tablet by mouth Daily.      coenzyme Q10 100 MG capsule Take 1 capsule by mouth Daily.      hydrOXYzine pamoate (Vistaril) 25 MG capsule Take 1 capsule every day by oral route at bedtime.      ondansetron (ZOFRAN) 4 MG tablet DISSOLVE 1 TABLET ON THE TONGUE EVERY 4 HOURS AS NEEDED FOR NAUSEA      Synthroid 88 MCG tablet Take 1 tablet by mouth Every Morning.      tamsulosin (FLOMAX) 0.4 MG capsule 24 hr capsule Take 1 capsule by mouth Every Night. 30 capsule 11    tamsulosin (FLOMAX) 0.4 MG capsule 24 hr capsule Take 1 capsule by mouth Every Night. 30 capsule 12     No current facility-administered medications for this visit.         Allergies:  Allergies   Allergen Reactions    Levaquin [Levofloxacin] Other (See Comments)     Urethritis          Review of Systems:   Review of Systems   Constitutional: Negative.  Negative for chills, fatigue and fever.   HENT: Negative.  Negative for congestion, facial swelling, mouth sores, sore throat, trouble swallowing and voice change.    Eyes: Negative.  Negative for pain, discharge and visual disturbance.   Respiratory: Negative.  Negative for apnea, cough, chest tightness, shortness of breath and wheezing.    Cardiovascular: Negative.  Negative for chest pain, palpitations and leg swelling.   Gastrointestinal: Negative.  Negative for abdominal distention, abdominal pain, anal bleeding, constipation, diarrhea, nausea and vomiting.   Endocrine: Negative.  Negative for cold intolerance, heat intolerance, polyphagia and polyuria.   Genitourinary: Negative.  Negative for difficulty urinating, dysuria, flank pain and hematuria.   Musculoskeletal:  Positive for arthralgias.   Skin: Negative.  Negative for color change, pallor, rash and wound.   Allergic/Immunologic: Negative.  Negative for environmental allergies, food allergies and  "immunocompromised state.   Neurological: Negative.  Negative for dizziness, tremors, seizures, syncope, facial asymmetry, speech difficulty, weakness, light-headedness, numbness and headaches.   Hematological: Negative.  Negative for adenopathy. Does not bruise/bleed easily.   Psychiatric/Behavioral: Negative.  Negative for behavioral problems, confusion, dysphoric mood, self-injury, sleep disturbance and suicidal ideas. The patient is not nervous/anxious.          Physical Exam:   Physical Exam  GENERAL: 74 y.o. male, alert and oriented X 3 in no acute distress.   Visit Vitals  Ht 182.9 cm (72.01\")   Wt 82.3 kg (181 lb 7 oz)   BMI 24.60 kg/m²       GENERAL APPEARANCE: Awake, alert & oriented, in no acute distress and well developed, well nourished.   PSYCH: Normal mood and affect  LUNGS: Breathing nonlabored, no wheezing  EYES: Sclera anicteric, pupils equal  CARDIOVASCULAR: Palpable pulses. Capillary refill less than 2 seconds  INTEGUMENTARY: Skin intact, co clubbing, cyanosis  NEUROLOGIC: Normal Sensation  MUSCULOSKELETAL:  Orthopedic Examination: Left hand reveals hyperextension of the MCP joint which is partially correctable.  Normal sensation with intact and functioning IP joint right thumb with minimal tenderness and negative grind of first CMC joint.          Radiology/Labs:     XR Finger 2+ View Left    Result Date: 9/4/2024    Degenerative change and probable periarticular erosion of the first metacarpal head which could represent gout. First carpal metacarpal joint degenerative change.   This report was finalized on 9/4/2024 11:01 AM by Dr. Manuel Jensen MD.      XR Hand 3+ View Left    Result Date: 9/4/2024  1.  DIP joint and first carpal metacarpal joint degenerative change. 2.  Some periarticular erosion noted of the first metacarpal head that could represent sequela of inflammatory arthritides or gout.   This report was finalized on 9/4/2024 10:26 AM by Dr. Manuel Jensen MD.           Radiographs hand " demonstrate hyperextension of the MCP joint right thumb with advanced arthritis right first CMC joint.          Assessment & Plan:   74 y.o. male presents with injury 2 months ago hyperextension right thumb which may be amenable to volar plate repair/advancement.  He also has a component of first CMC joint osteoarthritis which does not appear to be symptomatic.  We therefore opted not to provide steroid injection would prefer to obtain opinion from dedicated hand specialist and [facility] is [facility].        ICD-10-CM ICD-9-CM   1. Deformity of right thumb joint  M20.001 736.20           Cc:   Didier Ortiz MD                This document has been electronically signed by Anish Argueta MD   September 4, 2024 15:40 EDT

## 2025-02-12 ENCOUNTER — OFFICE VISIT (OUTPATIENT)
Dept: RADIATION ONCOLOGY | Facility: HOSPITAL | Age: 75
End: 2025-02-12
Payer: MEDICARE

## 2025-02-12 ENCOUNTER — HOSPITAL ENCOUNTER (OUTPATIENT)
Dept: RADIATION ONCOLOGY | Facility: HOSPITAL | Age: 75
Setting detail: RADIATION/ONCOLOGY SERIES
Discharge: HOME | End: 2025-02-12
Payer: MEDICARE

## 2025-02-12 DIAGNOSIS — C61 PROSTATE CANCER: Primary | ICD-10-CM

## 2025-02-12 RX ORDER — ATORVASTATIN CALCIUM 10 MG/1
TABLET, FILM COATED ORAL
COMMUNITY
Start: 2025-02-05

## 2025-02-12 RX ORDER — TAMSULOSIN HYDROCHLORIDE 0.4 MG/1
1 CAPSULE ORAL DAILY
COMMUNITY
End: 2025-02-12

## 2025-02-12 NOTE — PROGRESS NOTES
FOLLOW UP NOTE    PATIENT:                                                      Talon Anguiano  MEDICAL RECORD #:                        [id number]  :                                                          1950  COMPLETION DATE:   2022  DIAGNOSIS:     Prostate cancer  Initial Clinical- Stage I (cT1c, cN0, cM0, PSA: 9.3, Grade Group: 1)  Pre-treatment Clinical- Stage IIC (cT1c, cN0, cM0, PSA: 9.4, Grade Group: 3)      BRIEF HISTORY:    Routine follow-up visit.  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  Due to significant delay related to persistent, recurrent E. coli prostatitis requiring multiple rounds of antibiotics and patient seeking second opinion elsewhere, he was found to have stage migration to intermediate risk disease based on MRI targeted biopsy 2022.  Nuclear medicine bone scan 2022 showed no evidence of osseous metastatic disease.  Repeat CT scan of the abdomen and pelvis showed no evidence of concerning intra-abdominal or intrapelvic metastatic disease.  He received a 30-day course of bicalutamide prescribed by  urology, which decreased his PSA to 4.96 ng/ml on 2022.  He is now off androgen ablation.  He underwent definitive treatment with a course of CyberKnife SBRT delivered to the prostate gland.  He tolerated treatment well.  Following treatment, he experienced a 1 week duration of urinary burning, increased frequency, and hesitancy starting stream.  Symptoms were managed with Flomax and addition of Medrol Dosepak.  He reports resolution of aforementioned symptoms with completion of steroids.  Currently, he denies acute urinary complaints.  He denies hematuria, dysuria, or incontinence.  He has discontinued use of Flomax.  He denies fever, chills, or flank pain.  He denies change in bowel function.    Overall energy is improved.  He denies additional acute complaints today.  Overall, he feels well.  PSA was 1.87 ng/ml on  3/9/2023, 0.4 ng/ml 2/6/2024 and 0.2 ng/ml on 6/19/2024.    MEDICATIONS: Medication reconciliation for the patient was reviewed and confirmed in the electronic medical record.    Review of Systems   Musculoskeletal:  Positive for arthralgias.   All other systems reviewed and are negative.          IPSS Questionnaire (AUA-7):  Over the past month…    1)  Incomplete Emptying  How often have you had a sensation of not emptying your bladder?  0 - Not at all   2)  Frequency  How often have you had to urinate less than every two hours? 0 - Not at all   3)  Intermittency  How often have you found you stopped and started again several times when you urinated?  0 - Not at all   4) Urgency  How often have you found it difficult to postpone urination?  0 - Not at all   5) Weak Stream  How often have you had a weak urinary stream?  0 - Not at all   6) Straining  How often have you had to push or strain to begin urination?  0 - Not at all   7) Nocturia  How many times did you typically get up at night to urinate?  1 - 1 time   Total Score:  1       Quality of life due to urinary symptoms:  If you were to spend the rest of your life with your urinary condition the way it is now, how would you feel about that? 1-Pleased   Urine Leakage (Incontinence) 0-No Leakage     Sexual Health Inventory  Current Status    1)  How do you rate your confidence that you could achieve and keep an erection? 4-High   2) When you had erections with sexual stimulation, how often were your erections hard enough for penetration (entering your partner)? 0-No sexual activity   3)  During sexual intercourse, how often were you able to maintain your erection after you had penetrated (entered) into your partner? 0-Did not attempt intercourse   4) During sexual intercourse, how difficult was it to maintain your erection to completion of intercourse? 0-Did not attempt intercourse   5) When you attempted sexual intercourse, how often was it satisfactory to you?  0-No sexual activity   Total Score: 4       Bowel Health Inventory  Current Status: 0-No problems, no rectal bleeding, no discharge, less then 5 bowel movements a day            Physical Exam  Vitals and nursing note reviewed.   Constitutional:       Appearance: He is well-developed.   HENT:      Head: Normocephalic and atraumatic.   Cardiovascular:      Rate and Rhythm: Normal rate and regular rhythm.      Heart sounds: Normal heart sounds. No murmur heard.  Pulmonary:      Effort: Pulmonary effort is normal.      Breath sounds: Normal breath sounds. No wheezing or rales.   Abdominal:      General: Bowel sounds are normal. There is no distension.      Palpations: Abdomen is soft.      Tenderness: There is no abdominal tenderness.   Musculoskeletal:         General: No tenderness. Normal range of motion.      Cervical back: Normal range of motion and neck supple.   Lymphadenopathy:      Cervical: No cervical adenopathy.      Upper Body:      Right upper body: No supraclavicular adenopathy.      Left upper body: No supraclavicular adenopathy.   Skin:     General: Skin is warm and dry.   Neurological:      Mental Status: He is alert and oriented to person, place, and time.      Sensory: No sensory deficit.   Psychiatric:         Behavior: Behavior normal.         Thought Content: Thought content normal.         Judgment: Judgment normal.         VITAL SIGNS:   Weight: 195.9 lbs  Temp 98.3  Pulse 88  Resp 28  /65  O2 sat 95%                KSP %:  80    The following portions of the patient's history were reviewed and updated as appropriate: allergies, current medications, past family history, past medical history, past social history, past surgical history and problem list.         Diagnoses and all orders for this visit:    1. Prostate cancer (Primary)         IMPRESSION:  Prostate cancer, initially Peever 3+3 = 6, clinical stage I (T1c, N0, M0), diagnosed 6/9/2021 when his PSA was 7.58 ng/ml.    He had  multiple prostate infections with E. coli bacteria requiring ID consultation and several courses of antibiotics.  During that time, repeat pelvic MRI showed PI-RADS 5 suspicious index lesion in the anterior prostate gland, prompting MRI fusion biopsy performed 5/9/2022.    Ultimately, the patient has more recent diagnosis of Morro 3+4 = 7 disease, clinical stage IIC (T1c, N0, M0), maximum PSA 9.44 ng/ml.  Following 30-day trial of bicalutamide, PSA subsequently decreased to 4.96 ng/ml.  The patient is no longer receiving androgen ablation.  He is now more than 2 year status post CyberKnife SBRT as definitive treatment.  He tolerated treatment well.  Acute urinary irritative/outflow obstructive symptoms have appropriately resolved.  He is in disease remission, reaching target donna PSA value of less than 0.5 ng/ml.  Prognosis should be excellent.    RECOMMENDATIONS:  Continue biannual PSA testing.  He wishes to have this performed with his primary physician, Dr. Ortiz.  Annual follow-up with me.    I spent a total of 15 minutes on todays visit, with more than 10 minutes in direct face to face communication, and the remainder of the time spent in reviewing the relevant history, records, available imaging, and for documentation.    Return in about 1 year (around 2/12/2026) for Office Visit.    Clement Nicole MD